# Patient Record
Sex: FEMALE | Race: WHITE | NOT HISPANIC OR LATINO
[De-identification: names, ages, dates, MRNs, and addresses within clinical notes are randomized per-mention and may not be internally consistent; named-entity substitution may affect disease eponyms.]

---

## 2017-10-24 ENCOUNTER — APPOINTMENT (OUTPATIENT)
Dept: OBGYN | Facility: CLINIC | Age: 82
End: 2017-10-24

## 2017-10-24 ENCOUNTER — APPOINTMENT (OUTPATIENT)
Dept: OBGYN | Facility: CLINIC | Age: 82
End: 2017-10-24
Payer: MEDICARE

## 2017-10-24 VITALS
DIASTOLIC BLOOD PRESSURE: 82 MMHG | SYSTOLIC BLOOD PRESSURE: 130 MMHG | HEART RATE: 82 BPM | BODY MASS INDEX: 23.21 KG/M2 | WEIGHT: 131 LBS | HEIGHT: 63 IN | RESPIRATION RATE: 16 BRPM | OXYGEN SATURATION: 96 %

## 2017-10-24 DIAGNOSIS — Z13.820 ENCOUNTER FOR SCREENING FOR OSTEOPOROSIS: ICD-10-CM

## 2017-10-24 PROCEDURE — 99397 PER PM REEVAL EST PAT 65+ YR: CPT

## 2017-10-25 LAB — HPV HIGH+LOW RISK DNA PNL CVX: NOT DETECTED

## 2017-10-27 LAB — CYTOLOGY CVX/VAG DOC THIN PREP: NORMAL

## 2018-08-09 ENCOUNTER — OTHER (OUTPATIENT)
Age: 83
End: 2018-08-09

## 2018-10-18 ENCOUNTER — CHART COPY (OUTPATIENT)
Age: 83
End: 2018-10-18

## 2018-10-31 ENCOUNTER — APPOINTMENT (OUTPATIENT)
Dept: OBGYN | Facility: CLINIC | Age: 83
End: 2018-10-31
Payer: MEDICARE

## 2018-10-31 VITALS
HEART RATE: 83 BPM | SYSTOLIC BLOOD PRESSURE: 120 MMHG | OXYGEN SATURATION: 96 % | DIASTOLIC BLOOD PRESSURE: 78 MMHG | WEIGHT: 132 LBS | BODY MASS INDEX: 23.39 KG/M2 | HEIGHT: 63 IN

## 2018-10-31 DIAGNOSIS — N95.9 UNSPECIFIED MENOPAUSAL AND PERIMENOPAUSAL DISORDER: ICD-10-CM

## 2018-10-31 PROCEDURE — 99214 OFFICE O/P EST MOD 30 MIN: CPT

## 2018-10-31 RX ORDER — DORZOLAMIDE HYDROCHLORIDE TIMOLOL MALEATE 20; 5 MG/ML; MG/ML
22.3-6.8 SOLUTION/ DROPS OPHTHALMIC
Qty: 10 | Refills: 0 | Status: ACTIVE | COMMUNITY
Start: 2018-01-29

## 2018-10-31 RX ORDER — SULFACETAMIDE SODIUM AND PREDNISOLONE SODIUM PHOSPHATE 100; 2.3 MG/ML; MG/ML
10-0.23 SOLUTION/ DROPS OPHTHALMIC
Qty: 30 | Refills: 0 | Status: ACTIVE | COMMUNITY
Start: 2018-06-15

## 2018-10-31 RX ORDER — AMOXICILLIN 500 MG/1
500 TABLET, FILM COATED ORAL
Qty: 20 | Refills: 0 | Status: DISCONTINUED | COMMUNITY
Start: 2018-09-04 | End: 2018-10-31

## 2018-10-31 RX ORDER — DESOXIMETASONE 2.5 MG/G
0.25 CREAM TOPICAL
Qty: 60 | Refills: 0 | Status: DISCONTINUED | COMMUNITY
Start: 2018-05-29 | End: 2018-10-31

## 2018-10-31 RX ORDER — FLUOCINONIDE 0.5 MG/G
0.05 CREAM TOPICAL
Qty: 60 | Refills: 0 | Status: ACTIVE | COMMUNITY
Start: 2018-08-02

## 2018-10-31 RX ORDER — CLINDAMYCIN PHOSPHATE 10 MG/ML
1 LOTION TOPICAL
Qty: 60 | Refills: 0 | Status: DISCONTINUED | COMMUNITY
Start: 2018-05-29 | End: 2018-10-31

## 2019-07-31 DIAGNOSIS — Z12.39 ENCOUNTER FOR OTHER SCREENING FOR MALIGNANT NEOPLASM OF BREAST: ICD-10-CM

## 2019-10-30 ENCOUNTER — APPOINTMENT (OUTPATIENT)
Dept: OBGYN | Facility: CLINIC | Age: 84
End: 2019-10-30

## 2019-10-31 ENCOUNTER — APPOINTMENT (OUTPATIENT)
Dept: OBGYN | Facility: CLINIC | Age: 84
End: 2019-10-31
Payer: MEDICARE

## 2019-10-31 VITALS
BODY MASS INDEX: 21.26 KG/M2 | SYSTOLIC BLOOD PRESSURE: 124 MMHG | HEIGHT: 63 IN | HEART RATE: 75 BPM | DIASTOLIC BLOOD PRESSURE: 80 MMHG | OXYGEN SATURATION: 96 % | WEIGHT: 120 LBS

## 2019-10-31 DIAGNOSIS — Z01.419 ENCOUNTER FOR GYNECOLOGICAL EXAMINATION (GENERAL) (ROUTINE) W/OUT ABNORMAL FINDINGS: ICD-10-CM

## 2019-10-31 DIAGNOSIS — Z63.4 DISAPPEARANCE AND DEATH OF FAMILY MEMBER: ICD-10-CM

## 2019-10-31 PROCEDURE — 99397 PER PM REEVAL EST PAT 65+ YR: CPT

## 2019-10-31 SDOH — SOCIAL STABILITY - SOCIAL INSECURITY: DISSAPEARANCE AND DEATH OF FAMILY MEMBER: Z63.4

## 2019-10-31 NOTE — PHYSICAL EXAM
[Awake] : awake [Alert] : alert [Soft] : soft [Oriented x3] : oriented to person, place, and time [Normal] : uterus [Atrophy] : atrophy [No Bleeding] : there was no active vaginal bleeding [Uterine Adnexae] : were not tender and not enlarged [Acute Distress] : no acute distress [Mass] : no breast mass [Nipple Discharge] : no nipple discharge [Axillary LAD] : no axillary lymphadenopathy [Tender] : non tender

## 2019-10-31 NOTE — CHIEF COMPLAINT
[Follow Up] : follow up GYN visit [FreeTextEntry1] : pt here for f/u visit menopausal disorder--refuses dexa bec will not take meds\par colon 2017,pap 2017,mammoo 2019. Husb of 62 yrs passed away 3/2019.Pt lives alone. son travels a lot

## 2019-11-03 LAB — HPV HIGH+LOW RISK DNA PNL CVX: NOT DETECTED

## 2019-11-07 LAB — CYTOLOGY CVX/VAG DOC THIN PREP: ABNORMAL

## 2020-07-22 DIAGNOSIS — N94.9 UNSPECIFIED CONDITION ASSOCIATED WITH FEMALE GENITAL ORGANS AND MENSTRUAL CYCLE: ICD-10-CM

## 2020-09-08 ENCOUNTER — ASOB RESULT (OUTPATIENT)
Age: 85
End: 2020-09-08

## 2020-09-08 ENCOUNTER — APPOINTMENT (OUTPATIENT)
Dept: OBGYN | Facility: CLINIC | Age: 85
End: 2020-09-08
Payer: MEDICARE

## 2020-09-08 PROCEDURE — 76830 TRANSVAGINAL US NON-OB: CPT

## 2023-12-14 ENCOUNTER — INPATIENT (INPATIENT)
Facility: HOSPITAL | Age: 88
LOS: 4 days | Discharge: NURSING, REHAB OR LONG-TERM CARE | DRG: 536 | End: 2023-12-19
Attending: INTERNAL MEDICINE | Admitting: INTERNAL MEDICINE
Payer: MEDICARE

## 2023-12-14 VITALS
HEART RATE: 100 BPM | WEIGHT: 110.01 LBS | TEMPERATURE: 98 F | DIASTOLIC BLOOD PRESSURE: 74 MMHG | HEIGHT: 63 IN | RESPIRATION RATE: 18 BRPM | SYSTOLIC BLOOD PRESSURE: 118 MMHG | OXYGEN SATURATION: 96 %

## 2023-12-14 DIAGNOSIS — S32.599A OTHER SPECIFIED FRACTURE OF UNSPECIFIED PUBIS, INITIAL ENCOUNTER FOR CLOSED FRACTURE: ICD-10-CM

## 2023-12-14 LAB
ACANTHOCYTES BLD QL SMEAR: SLIGHT — SIGNIFICANT CHANGE UP
ACANTHOCYTES BLD QL SMEAR: SLIGHT — SIGNIFICANT CHANGE UP
ALBUMIN SERPL ELPH-MCNC: 3.8 G/DL — SIGNIFICANT CHANGE UP (ref 3.3–5)
ALBUMIN SERPL ELPH-MCNC: 3.8 G/DL — SIGNIFICANT CHANGE UP (ref 3.3–5)
ALP SERPL-CCNC: 59 U/L — SIGNIFICANT CHANGE UP (ref 40–120)
ALP SERPL-CCNC: 59 U/L — SIGNIFICANT CHANGE UP (ref 40–120)
ALT FLD-CCNC: 53 U/L — HIGH (ref 10–45)
ALT FLD-CCNC: 53 U/L — HIGH (ref 10–45)
ANION GAP SERPL CALC-SCNC: 9 MMOL/L — SIGNIFICANT CHANGE UP (ref 5–17)
ANION GAP SERPL CALC-SCNC: 9 MMOL/L — SIGNIFICANT CHANGE UP (ref 5–17)
ANISOCYTOSIS BLD QL: SLIGHT — SIGNIFICANT CHANGE UP
ANISOCYTOSIS BLD QL: SLIGHT — SIGNIFICANT CHANGE UP
APTT BLD: 27.7 SEC — SIGNIFICANT CHANGE UP (ref 24.5–35.6)
APTT BLD: 27.7 SEC — SIGNIFICANT CHANGE UP (ref 24.5–35.6)
AST SERPL-CCNC: 32 U/L — SIGNIFICANT CHANGE UP (ref 10–40)
AST SERPL-CCNC: 32 U/L — SIGNIFICANT CHANGE UP (ref 10–40)
BASOPHILS # BLD AUTO: 0 K/UL — SIGNIFICANT CHANGE UP (ref 0–0.2)
BASOPHILS # BLD AUTO: 0 K/UL — SIGNIFICANT CHANGE UP (ref 0–0.2)
BASOPHILS NFR BLD AUTO: 0 % — SIGNIFICANT CHANGE UP (ref 0–2)
BASOPHILS NFR BLD AUTO: 0 % — SIGNIFICANT CHANGE UP (ref 0–2)
BILIRUB SERPL-MCNC: 0.7 MG/DL — SIGNIFICANT CHANGE UP (ref 0.2–1.2)
BILIRUB SERPL-MCNC: 0.7 MG/DL — SIGNIFICANT CHANGE UP (ref 0.2–1.2)
BUN SERPL-MCNC: 28 MG/DL — HIGH (ref 7–23)
BUN SERPL-MCNC: 28 MG/DL — HIGH (ref 7–23)
CALCIUM SERPL-MCNC: 9.3 MG/DL — SIGNIFICANT CHANGE UP (ref 8.4–10.5)
CALCIUM SERPL-MCNC: 9.3 MG/DL — SIGNIFICANT CHANGE UP (ref 8.4–10.5)
CHLORIDE SERPL-SCNC: 102 MMOL/L — SIGNIFICANT CHANGE UP (ref 96–108)
CHLORIDE SERPL-SCNC: 102 MMOL/L — SIGNIFICANT CHANGE UP (ref 96–108)
CO2 SERPL-SCNC: 27 MMOL/L — SIGNIFICANT CHANGE UP (ref 22–31)
CO2 SERPL-SCNC: 27 MMOL/L — SIGNIFICANT CHANGE UP (ref 22–31)
CREAT SERPL-MCNC: 0.66 MG/DL — SIGNIFICANT CHANGE UP (ref 0.5–1.3)
CREAT SERPL-MCNC: 0.66 MG/DL — SIGNIFICANT CHANGE UP (ref 0.5–1.3)
DACRYOCYTES BLD QL SMEAR: SLIGHT — SIGNIFICANT CHANGE UP
DACRYOCYTES BLD QL SMEAR: SLIGHT — SIGNIFICANT CHANGE UP
EGFR: 84 ML/MIN/1.73M2 — SIGNIFICANT CHANGE UP
EGFR: 84 ML/MIN/1.73M2 — SIGNIFICANT CHANGE UP
EOSINOPHIL # BLD AUTO: 0 K/UL — SIGNIFICANT CHANGE UP (ref 0–0.5)
EOSINOPHIL # BLD AUTO: 0 K/UL — SIGNIFICANT CHANGE UP (ref 0–0.5)
EOSINOPHIL NFR BLD AUTO: 0 % — SIGNIFICANT CHANGE UP (ref 0–6)
EOSINOPHIL NFR BLD AUTO: 0 % — SIGNIFICANT CHANGE UP (ref 0–6)
GLUCOSE SERPL-MCNC: 81 MG/DL — SIGNIFICANT CHANGE UP (ref 70–99)
GLUCOSE SERPL-MCNC: 81 MG/DL — SIGNIFICANT CHANGE UP (ref 70–99)
HCT VFR BLD CALC: 34.2 % — LOW (ref 34.5–45)
HCT VFR BLD CALC: 34.2 % — LOW (ref 34.5–45)
HGB BLD-MCNC: 11.1 G/DL — LOW (ref 11.5–15.5)
HGB BLD-MCNC: 11.1 G/DL — LOW (ref 11.5–15.5)
INR BLD: 1.3 RATIO — HIGH (ref 0.85–1.18)
INR BLD: 1.3 RATIO — HIGH (ref 0.85–1.18)
LYMPHOCYTES # BLD AUTO: 0.87 K/UL — LOW (ref 1–3.3)
LYMPHOCYTES # BLD AUTO: 0.87 K/UL — LOW (ref 1–3.3)
LYMPHOCYTES # BLD AUTO: 10.5 % — LOW (ref 13–44)
LYMPHOCYTES # BLD AUTO: 10.5 % — LOW (ref 13–44)
MACROCYTES BLD QL: SLIGHT — SIGNIFICANT CHANGE UP
MACROCYTES BLD QL: SLIGHT — SIGNIFICANT CHANGE UP
MANUAL SMEAR VERIFICATION: SIGNIFICANT CHANGE UP
MANUAL SMEAR VERIFICATION: SIGNIFICANT CHANGE UP
MCHC RBC-ENTMCNC: 30.2 PG — SIGNIFICANT CHANGE UP (ref 27–34)
MCHC RBC-ENTMCNC: 30.2 PG — SIGNIFICANT CHANGE UP (ref 27–34)
MCHC RBC-ENTMCNC: 32.5 GM/DL — SIGNIFICANT CHANGE UP (ref 32–36)
MCHC RBC-ENTMCNC: 32.5 GM/DL — SIGNIFICANT CHANGE UP (ref 32–36)
MCV RBC AUTO: 92.9 FL — SIGNIFICANT CHANGE UP (ref 80–100)
MCV RBC AUTO: 92.9 FL — SIGNIFICANT CHANGE UP (ref 80–100)
MONOCYTES # BLD AUTO: 0.15 K/UL — SIGNIFICANT CHANGE UP (ref 0–0.9)
MONOCYTES # BLD AUTO: 0.15 K/UL — SIGNIFICANT CHANGE UP (ref 0–0.9)
MONOCYTES NFR BLD AUTO: 1.8 % — LOW (ref 2–14)
MONOCYTES NFR BLD AUTO: 1.8 % — LOW (ref 2–14)
NEUTROPHILS # BLD AUTO: 7.24 K/UL — SIGNIFICANT CHANGE UP (ref 1.8–7.4)
NEUTROPHILS # BLD AUTO: 7.24 K/UL — SIGNIFICANT CHANGE UP (ref 1.8–7.4)
NEUTROPHILS NFR BLD AUTO: 87.7 % — HIGH (ref 43–77)
NEUTROPHILS NFR BLD AUTO: 87.7 % — HIGH (ref 43–77)
OVALOCYTES BLD QL SMEAR: SLIGHT — SIGNIFICANT CHANGE UP
OVALOCYTES BLD QL SMEAR: SLIGHT — SIGNIFICANT CHANGE UP
PLAT MORPH BLD: NORMAL — SIGNIFICANT CHANGE UP
PLAT MORPH BLD: NORMAL — SIGNIFICANT CHANGE UP
PLATELET # BLD AUTO: 113 K/UL — LOW (ref 150–400)
PLATELET # BLD AUTO: 113 K/UL — LOW (ref 150–400)
POIKILOCYTOSIS BLD QL AUTO: SLIGHT — SIGNIFICANT CHANGE UP
POIKILOCYTOSIS BLD QL AUTO: SLIGHT — SIGNIFICANT CHANGE UP
POLYCHROMASIA BLD QL SMEAR: SLIGHT — SIGNIFICANT CHANGE UP
POLYCHROMASIA BLD QL SMEAR: SLIGHT — SIGNIFICANT CHANGE UP
POTASSIUM SERPL-MCNC: 3.7 MMOL/L — SIGNIFICANT CHANGE UP (ref 3.5–5.3)
POTASSIUM SERPL-MCNC: 3.7 MMOL/L — SIGNIFICANT CHANGE UP (ref 3.5–5.3)
POTASSIUM SERPL-SCNC: 3.7 MMOL/L — SIGNIFICANT CHANGE UP (ref 3.5–5.3)
POTASSIUM SERPL-SCNC: 3.7 MMOL/L — SIGNIFICANT CHANGE UP (ref 3.5–5.3)
PROT SERPL-MCNC: 6.2 G/DL — SIGNIFICANT CHANGE UP (ref 6–8.3)
PROT SERPL-MCNC: 6.2 G/DL — SIGNIFICANT CHANGE UP (ref 6–8.3)
PROTHROM AB SERPL-ACNC: 13.5 SEC — HIGH (ref 9.5–13)
PROTHROM AB SERPL-ACNC: 13.5 SEC — HIGH (ref 9.5–13)
RBC # BLD: 3.68 M/UL — LOW (ref 3.8–5.2)
RBC # BLD: 3.68 M/UL — LOW (ref 3.8–5.2)
RBC # FLD: 15.3 % — HIGH (ref 10.3–14.5)
RBC # FLD: 15.3 % — HIGH (ref 10.3–14.5)
RBC BLD AUTO: ABNORMAL
RBC BLD AUTO: ABNORMAL
SCHISTOCYTES BLD QL AUTO: SLIGHT — SIGNIFICANT CHANGE UP
SCHISTOCYTES BLD QL AUTO: SLIGHT — SIGNIFICANT CHANGE UP
SODIUM SERPL-SCNC: 138 MMOL/L — SIGNIFICANT CHANGE UP (ref 135–145)
SODIUM SERPL-SCNC: 138 MMOL/L — SIGNIFICANT CHANGE UP (ref 135–145)
WBC # BLD: 8.25 K/UL — SIGNIFICANT CHANGE UP (ref 3.8–10.5)
WBC # BLD: 8.25 K/UL — SIGNIFICANT CHANGE UP (ref 3.8–10.5)
WBC # FLD AUTO: 8.25 K/UL — SIGNIFICANT CHANGE UP (ref 3.8–10.5)
WBC # FLD AUTO: 8.25 K/UL — SIGNIFICANT CHANGE UP (ref 3.8–10.5)

## 2023-12-14 PROCEDURE — 72170 X-RAY EXAM OF PELVIS: CPT | Mod: 26,XS

## 2023-12-14 PROCEDURE — 99285 EMERGENCY DEPT VISIT HI MDM: CPT

## 2023-12-14 PROCEDURE — 73552 X-RAY EXAM OF FEMUR 2/>: CPT | Mod: 26,LT

## 2023-12-14 PROCEDURE — 73502 X-RAY EXAM HIP UNI 2-3 VIEWS: CPT | Mod: 26,LT

## 2023-12-14 PROCEDURE — 70450 CT HEAD/BRAIN W/O DYE: CPT | Mod: 26,MA

## 2023-12-14 PROCEDURE — 76376 3D RENDER W/INTRP POSTPROCES: CPT | Mod: 26

## 2023-12-14 PROCEDURE — 72192 CT PELVIS W/O DYE: CPT | Mod: 26,MA

## 2023-12-14 RX ORDER — DORZOLAMIDE HYDROCHLORIDE TIMOLOL MALEATE 20; 5 MG/ML; MG/ML
1 SOLUTION/ DROPS OPHTHALMIC
Refills: 0 | Status: DISCONTINUED | OUTPATIENT
Start: 2023-12-14 | End: 2023-12-19

## 2023-12-14 RX ORDER — TERAZOSIN HYDROCHLORIDE 10 MG/1
1 CAPSULE ORAL
Refills: 0 | DISCHARGE

## 2023-12-14 RX ORDER — METOPROLOL TARTRATE 50 MG
100 TABLET ORAL DAILY
Refills: 0 | Status: DISCONTINUED | OUTPATIENT
Start: 2023-12-14 | End: 2023-12-17

## 2023-12-14 RX ORDER — ATORVASTATIN CALCIUM 80 MG/1
10 TABLET, FILM COATED ORAL AT BEDTIME
Refills: 0 | Status: DISCONTINUED | OUTPATIENT
Start: 2023-12-14 | End: 2023-12-19

## 2023-12-14 RX ORDER — PANTOPRAZOLE SODIUM 20 MG/1
40 TABLET, DELAYED RELEASE ORAL
Refills: 0 | Status: DISCONTINUED | OUTPATIENT
Start: 2023-12-14 | End: 2023-12-19

## 2023-12-14 RX ORDER — ATORVASTATIN CALCIUM 80 MG/1
1 TABLET, FILM COATED ORAL
Refills: 0 | DISCHARGE

## 2023-12-14 RX ORDER — HEPARIN SODIUM 5000 [USP'U]/ML
5000 INJECTION INTRAVENOUS; SUBCUTANEOUS EVERY 12 HOURS
Refills: 0 | Status: DISCONTINUED | OUTPATIENT
Start: 2023-12-14 | End: 2023-12-19

## 2023-12-14 RX ORDER — LATANOPROST 0.05 MG/ML
1 SOLUTION/ DROPS OPHTHALMIC; TOPICAL AT BEDTIME
Refills: 0 | Status: DISCONTINUED | OUTPATIENT
Start: 2023-12-14 | End: 2023-12-19

## 2023-12-14 RX ORDER — DOXAZOSIN MESYLATE 4 MG
4 TABLET ORAL AT BEDTIME
Refills: 0 | Status: DISCONTINUED | OUTPATIENT
Start: 2023-12-14 | End: 2023-12-19

## 2023-12-14 RX ORDER — ACETAMINOPHEN 500 MG
975 TABLET ORAL ONCE
Refills: 0 | Status: COMPLETED | OUTPATIENT
Start: 2023-12-14 | End: 2023-12-14

## 2023-12-14 RX ORDER — ESOMEPRAZOLE MAGNESIUM 40 MG/1
1 CAPSULE, DELAYED RELEASE ORAL
Refills: 0 | DISCHARGE

## 2023-12-14 RX ADMIN — Medication 975 MILLIGRAM(S): at 21:34

## 2023-12-14 RX ADMIN — Medication 975 MILLIGRAM(S): at 14:37

## 2023-12-14 NOTE — H&P ADULT - NSHPPHYSICALEXAM_GEN_ALL_CORE
Vital Signs Last 24 Hrs  T(C): 36.6 (14 Dec 2023 22:04), Max: 36.8 (14 Dec 2023 13:44)  T(F): 97.9 (14 Dec 2023 22:04), Max: 98.3 (14 Dec 2023 13:44)  HR: 110 (14 Dec 2023 22:04) (100 - 114)  BP: 111/75 (14 Dec 2023 22:04) (111/75 - 128/73)  BP(mean): 92 (14 Dec 2023 17:44) (92 - 92)  RR: 18 (14 Dec 2023 22:04) (16 - 18)  SpO2: 92% (14 Dec 2023 22:04) (92% - 96%)    Parameters below as of 14 Dec 2023 22:04  Patient On (Oxygen Delivery Method): room air Vital Signs Last 24 Hrs  T(C): 36.6 (14 Dec 2023 22:04), Max: 36.8 (14 Dec 2023 13:44)  T(F): 97.9 (14 Dec 2023 22:04), Max: 98.3 (14 Dec 2023 13:44)  HR: 110 (14 Dec 2023 22:04) (100 - 114)  BP: 111/75 (14 Dec 2023 22:04) (111/75 - 128/73)  BP(mean): 92 (14 Dec 2023 17:44) (92 - 92)  RR: 18 (14 Dec 2023 22:04) (16 - 18)  SpO2: 92% (14 Dec 2023 22:04) (92% - 96%)    Parameters below as of 14 Dec 2023 22:04  Patient On (Oxygen Delivery Method): room air    heent : nc/at   neck : supple, no JVD  lungs : B/L fair A/E , no w/r/r  Heart: s1s2 nml  abd : soft, NABS, NT/ND  ext : no e/c/c, pulses 2 +  neuro: aaox3

## 2023-12-14 NOTE — ED ADULT NURSE NOTE - OBJECTIVE STATEMENT
Pt is a 87 yo female A&OX4 PMH HTN, HLD presenting to the ED by EMS from home complaining of a fall. Pt states she fell yesterday while walking with her cane, states she "lost her balance," and fell on carpet. Pt denies SOB, CP, dizziness prior to the fall. Pt endorsing L hip pain, unable to walk and place pressure to the L side. Pt states she hit the back of her head, denies LOC, denies blood thinner use. Pt states she was able to get herself up to a sitting position following the son but is unable to put pressure on L side. Pts son has been carrying the pt to get around, ambulates with a cane at baseline. Denies fever/chills, SOB, chest pain, abd pain, n/v/diarrhea, urinary symptoms, weakness, dizziness. 20G placed in LAC. Call bell within reach, side rails up, bed in lowest position.

## 2023-12-14 NOTE — H&P ADULT - NSHPLABSRESULTS_GEN_ALL_CORE
11.1   8.25  )-----------( 113      ( 14 Dec 2023 14:57 )             34.2   12-14    138  |  102  |  28<H>  ----------------------------<  81  3.7   |  27  |  0.66    Ca    9.3      14 Dec 2023 14:57    TPro  6.2  /  Alb  3.8  /  TBili  0.7  /  DBili  x   /  AST  32  /  ALT  53<H>  /  AlkPhos  59  12-14

## 2023-12-14 NOTE — ED PROVIDER NOTE - PHYSICAL EXAMINATION
yes
GEN: Pt elderly, non-toxic in NAD, alert.  PSYCH: Affect and mood appropriate.  EYES: Sclera white w/o injection, EOMI, PERRLA.   ENT: Neck supple. Airway patent.  RESP: CTA b/l, no wheezes, rales, or rhonchi.   CARDIAC: RRR, clear distinct S1, S2.  ABD: Soft, non-tender. No CVAT b/l.  MSK: No obvious joint deformity. Mild ttp anterior L hip. Neg log roll. Unable to actively range. +Passive ROM. NV intact distally. No midline spinal ttp.  NEURO: Nonfocal.  VASC: Strong distal pulses. Trace LE pitting edema. No calf tenderness.  SKIN: No rashes or lesions.

## 2023-12-14 NOTE — ED PROVIDER NOTE - PROGRESS NOTE DETAILS
Freddie Weems PA-C: Imaging shows left superior and inferior pubic rami fractures.  Patient seen by Ortho, fracture is nonoperative.  CT head negative.  Pt endorsed to Dr. Michele Gray for admission for PT, likely dc to [facility]. Freddie Weems PA-C: Imaging shows left superior and inferior pubic rami fractures.  Patient seen by Ortho, fracture is nonoperative.  CT head negative.  Pt endorsed to Dr. Michele Gray for admission for PT, likely dc to [facility]. Freddie Weems PA-C: Imaging shows left superior and inferior pubic rami fractures.  Patient seen by Ortho, fracture is nonoperative.  CT head negative.  Pt endorsed to Dr. March for admission for PT, likely dc to [facility]. Freddie Weems PA-C: Imaging shows left superior and inferior pubic rami fractures.  Patient seen by Ortho, fracture is nonoperative.  CT head negative.  Pt endorsed to Dr. March for admission for PT, likely dc to [facility].

## 2023-12-14 NOTE — CONSULT NOTE ADULT - ASSESSMENT
ASSESSMENT & PLAN  88yFemale w/ L sup/inf pubic rami fx.  -WBAT LLE  -pain control  -ice/cold compress  -PT/OT  -no acute ortho surgery at this time  -f/u outpt with Dr. Agosto in 1 week, call office for appt    For all questions related to patient care, please reach out to the on-call team via the pager.     Geeta Rasheed, PGY 2  Orthopaedic Surgery  [facility] [phone]  [facility] [phone]  [facility] [phone]   ASSESSMENT & PLAN  88yFemale w/ L sup/inf pubic rami fx.  -WBAT LLE  -pain control  -ice/cold compress  -PT/OT  -no acute ortho surgery at this time  -f/u outpt with Dr. Agosto in 1 week, call office for appt    For all questions related to patient care, please reach out to the on-call team via the pager.     Geeta Rasheed, PGY 2  Orthopaedic Surgery  [facility] [phone]  [facility] [phone]  [facility] [phone]

## 2023-12-14 NOTE — ED PROVIDER NOTE - NSICDXPASTMEDICALHX_GEN_ALL_CORE_FT
PAST MEDICAL HISTORY:  Arrhythmia     GERD (Gastroesophageal Reflux Disease)     Glaucoma     Hip Fx     HTN (Hypertension)     Hyperlipidemia     Polyp of Corpus Uteri

## 2023-12-14 NOTE — CONSULT NOTE ADULT - SUBJECTIVE AND OBJECTIVE BOX
HPI  88yFemale w/ PMH of HTN, HLD, glaucoma c/o L groin pain s/p mechanical fall yesterday. Unable to bear weight in the LLE since the fall. Denies headstrike or LOC. Denies numbness/tingling in the LLE. Denies any other trauma/injuries at this time. At baseline, community ambulator w cane.    ROS  Negative unless otherwise specified in HPI.    PAST MEDICAL & SURGICAL Hx  PAST MEDICAL & SURGICAL HISTORY:  HTN (Hypertension)      Hyperlipidemia      Glaucoma      Arrhythmia      Hip Fx      Polyp of Corpus Uteri      GERD (Gastroesophageal Reflux Disease)       Delivery x 2      Hip Fx-ORIF Right Hip-      Breast Cyst removal -       History of Tonsillectomy      cholecystectomy 2011          MEDICATIONS  Home Medications:      ALLERGIES  No Known Allergies      FAMILY Hx  FAMILY HISTORY:      SOCIAL Hx  Social History:      VITALS  Vital Signs Last 24 Hrs  T(C): 36.7 (14 Dec 2023 14:47), Max: 36.8 (14 Dec 2023 13:44)  T(F): 98 (14 Dec 2023 14:47), Max: 98.3 (14 Dec 2023 13:44)  HR: 109 (14 Dec 2023 14:47) (100 - 109)  BP: 125/76 (14 Dec 2023 14:47) (118/74 - 125/76)  BP(mean): 92 (14 Dec 2023 14:47) (92 - 92)  RR: 16 (14 Dec 2023 14:47) (16 - 18)  SpO2: 95% (14 Dec 2023 14:47) (95% - 96%)    Parameters below as of 14 Dec 2023 14:47  Patient On (Oxygen Delivery Method): room air        PHYSICAL EXAM  Gen: Lying in bed, NAD  Resp: No increased WOB  LLE:  Skin intact  +TTP over L groin, no TTP along remainder of extremity; compartments soft  Limited ROM of hip 2/2 pain, passive ROM with only mild pain  (-) Log roll test  No pain with axial loading  Motor: TA/EHL/GS/FHL intact  Sensory: DP/SP/Tib/[place]/Saph SILT  +DP pulse, WWP    LABS                        11.1   8.25  )-----------( 113      ( 14 Dec 2023 14:57 )             34.2     12-14    138  |  102  |  28<H>  ----------------------------<  81  3.7   |  27  |  0.66    Ca    9.3      14 Dec 2023 14:57    TPro  6.2  /  Alb  3.8  /  TBili  0.7  /  DBili  x   /  AST  32  /  ALT  53<H>  /  AlkPhos  59  12-14    PT/INR - ( 14 Dec 2023 14:57 )   PT: 13.5 sec;   INR: 1.30 ratio         PTT - ( 14 Dec 2023 14:57 )  PTT:27.7 sec    IMAGING  XRs: L sup/inf pubic rami fx

## 2023-12-14 NOTE — ED PROVIDER NOTE - NSICDXPASTSURGICALHX_GEN_ALL_CORE_FT
PAST SURGICAL HISTORY:  Breast Cyst removal -       Delivery x 2     cholecystectomy 2011     Hip Fx-ORIF Right Hip-     History of Tonsillectomy

## 2023-12-14 NOTE — ED ADULT NURSE NOTE - NSICDXPASTSURGICALHX_GEN_ALL_CORE_FT
Care Management Update Received call from Briana Roque, [facility], she was inquiring about orders for outpatient Physical Therapy. Discussed with nurse that patient in OBS status, no new orders should be required for resumption of outpatient physical therapy. Chandrika Stokes RN, BSN, [facility] 
ED Care Management [phone] PAST SURGICAL HISTORY:  Breast Cyst removal -       Delivery x 2     cholecystectomy 2011     Hip Fx-ORIF Right Hip-     History of Tonsillectomy

## 2023-12-14 NOTE — H&P ADULT - ASSESSMENT
A/P     S/P mechanical fall / left sup/ inf pubic rami fracture   -seen by ortho in ED   conservative Rx   pain meds   PT eval   may need STR on d/c     HTN :   c/w toprol     Glaucoma :  c/w eye drops     dispo: PT eval , may need STR on d/c

## 2023-12-14 NOTE — ED ADULT NURSE NOTE - NSFALLHARMRISKINTERV_ED_ALL_ED
Assistance OOB with selected safe patient handling equipment if applicable/Communicate risk of Fall with Harm to all staff, patient, and family/Monitor gait and stability/Provide patient with walking aids/Provide visual cue: red socks, yellow wristband, yellow gown, etc/Reinforce activity limits and safety measures with patient and family/Bed in lowest position, wheels locked, appropriate side rails in place/Call bell, personal items and telephone in reach/Instruct patient to call for assistance before getting out of bed/chair/stretcher/Non-slip footwear applied when patient is off stretcher/[place] to call system/Physically safe environment - no spills, clutter or unnecessary equipment/Purposeful Proactive Rounding/Room/bathroom lighting operational, light cord in reach Assistance OOB with selected safe patient handling equipment if applicable/Communicate risk of Fall with Harm to all staff, patient, and family/Monitor gait and stability/Provide patient with walking aids/Provide visual cue: red socks, yellow wristband, yellow gown, etc/Reinforce activity limits and safety measures with patient and family/Bed in lowest position, wheels locked, appropriate side rails in place/Call bell, personal items and telephone in reach/Instruct patient to call for assistance before getting out of bed/chair/stretcher/Non-slip footwear applied when patient is off stretcher/[place] to call system/Physically safe environment - no spills, clutter or unnecessary equipment/Purposeful Proactive Rounding/Room/bathroom lighting operational, light cord in reach

## 2023-12-14 NOTE — ED PROVIDER NOTE - CLINICAL SUMMARY MEDICAL DECISION MAKING FREE TEXT BOX
Dr. Hickey (Attending Physician)  88-year-old female past medical history hypertension, hyperlipidemia, glaucoma, not on AC, presents to ED complaining of left hip and groin pain with inability to fully bear weight and difficulty ambulating status post unwitnessed mechanical trip and fall from standing yesterday afternoon.  Recalls event, fell while walking with a cane.  Landed on buttocks and then hit back of head on floor.  No LOC.  Was able to get under her own power.  Denies dizziness, vision changes, neck pain, chest pain, shortness of breath, abdominal pain, nausea, vomiting, urinary complaints.    Will check basic labs, xray hip/pelvis and reassess. ddx included hip, pelvic fractures. low suspicion for ICH with described mechanism of fall that she remembers.

## 2023-12-14 NOTE — ED PROVIDER NOTE - OBJECTIVE STATEMENT
88-year-old female past medical history hypertension, hyperlipidemia, glaucoma, not on AC, presents to ED complaining of left hip and groin pain with inability to fully bear weight and difficulty ambulating status post unwitnessed mechanical trip and fall from standing yesterday afternoon.  Recalls event, fell while walking with a cane.  Landed on buttocks and then hit back of head on floor.  No LOC.  Was able to get under her own power.  Denies dizziness, vision changes, neck pain, chest pain, shortness of breath, abdominal pain, nausea, vomiting, urinary complaints.

## 2023-12-15 LAB
ALBUMIN SERPL ELPH-MCNC: 3.2 G/DL — LOW (ref 3.3–5)
ALBUMIN SERPL ELPH-MCNC: 3.2 G/DL — LOW (ref 3.3–5)
ALP SERPL-CCNC: 53 U/L — SIGNIFICANT CHANGE UP (ref 40–120)
ALP SERPL-CCNC: 53 U/L — SIGNIFICANT CHANGE UP (ref 40–120)
ALT FLD-CCNC: 40 U/L — SIGNIFICANT CHANGE UP (ref 10–45)
ALT FLD-CCNC: 40 U/L — SIGNIFICANT CHANGE UP (ref 10–45)
ANION GAP SERPL CALC-SCNC: 8 MMOL/L — SIGNIFICANT CHANGE UP (ref 5–17)
ANION GAP SERPL CALC-SCNC: 8 MMOL/L — SIGNIFICANT CHANGE UP (ref 5–17)
AST SERPL-CCNC: 22 U/L — SIGNIFICANT CHANGE UP (ref 10–40)
AST SERPL-CCNC: 22 U/L — SIGNIFICANT CHANGE UP (ref 10–40)
BILIRUB SERPL-MCNC: 0.5 MG/DL — SIGNIFICANT CHANGE UP (ref 0.2–1.2)
BILIRUB SERPL-MCNC: 0.5 MG/DL — SIGNIFICANT CHANGE UP (ref 0.2–1.2)
BUN SERPL-MCNC: 26 MG/DL — HIGH (ref 7–23)
BUN SERPL-MCNC: 26 MG/DL — HIGH (ref 7–23)
CALCIUM SERPL-MCNC: 9 MG/DL — SIGNIFICANT CHANGE UP (ref 8.4–10.5)
CALCIUM SERPL-MCNC: 9 MG/DL — SIGNIFICANT CHANGE UP (ref 8.4–10.5)
CHLORIDE SERPL-SCNC: 104 MMOL/L — SIGNIFICANT CHANGE UP (ref 96–108)
CHLORIDE SERPL-SCNC: 104 MMOL/L — SIGNIFICANT CHANGE UP (ref 96–108)
CO2 SERPL-SCNC: 29 MMOL/L — SIGNIFICANT CHANGE UP (ref 22–31)
CO2 SERPL-SCNC: 29 MMOL/L — SIGNIFICANT CHANGE UP (ref 22–31)
CREAT SERPL-MCNC: 0.68 MG/DL — SIGNIFICANT CHANGE UP (ref 0.5–1.3)
CREAT SERPL-MCNC: 0.68 MG/DL — SIGNIFICANT CHANGE UP (ref 0.5–1.3)
EGFR: 84 ML/MIN/1.73M2 — SIGNIFICANT CHANGE UP
EGFR: 84 ML/MIN/1.73M2 — SIGNIFICANT CHANGE UP
GLUCOSE SERPL-MCNC: 80 MG/DL — SIGNIFICANT CHANGE UP (ref 70–99)
GLUCOSE SERPL-MCNC: 80 MG/DL — SIGNIFICANT CHANGE UP (ref 70–99)
HCT VFR BLD CALC: 31.3 % — LOW (ref 34.5–45)
HCT VFR BLD CALC: 31.3 % — LOW (ref 34.5–45)
HGB BLD-MCNC: 10.2 G/DL — LOW (ref 11.5–15.5)
HGB BLD-MCNC: 10.2 G/DL — LOW (ref 11.5–15.5)
MCHC RBC-ENTMCNC: 30.5 PG — SIGNIFICANT CHANGE UP (ref 27–34)
MCHC RBC-ENTMCNC: 30.5 PG — SIGNIFICANT CHANGE UP (ref 27–34)
MCHC RBC-ENTMCNC: 32.6 GM/DL — SIGNIFICANT CHANGE UP (ref 32–36)
MCHC RBC-ENTMCNC: 32.6 GM/DL — SIGNIFICANT CHANGE UP (ref 32–36)
MCV RBC AUTO: 93.7 FL — SIGNIFICANT CHANGE UP (ref 80–100)
MCV RBC AUTO: 93.7 FL — SIGNIFICANT CHANGE UP (ref 80–100)
NRBC # BLD: 0 /100 WBCS — SIGNIFICANT CHANGE UP (ref 0–0)
NRBC # BLD: 0 /100 WBCS — SIGNIFICANT CHANGE UP (ref 0–0)
PLATELET # BLD AUTO: 98 K/UL — LOW (ref 150–400)
PLATELET # BLD AUTO: 98 K/UL — LOW (ref 150–400)
POTASSIUM SERPL-MCNC: 4.2 MMOL/L — SIGNIFICANT CHANGE UP (ref 3.5–5.3)
POTASSIUM SERPL-MCNC: 4.2 MMOL/L — SIGNIFICANT CHANGE UP (ref 3.5–5.3)
POTASSIUM SERPL-SCNC: 4.2 MMOL/L — SIGNIFICANT CHANGE UP (ref 3.5–5.3)
POTASSIUM SERPL-SCNC: 4.2 MMOL/L — SIGNIFICANT CHANGE UP (ref 3.5–5.3)
PROT SERPL-MCNC: 5.5 G/DL — LOW (ref 6–8.3)
PROT SERPL-MCNC: 5.5 G/DL — LOW (ref 6–8.3)
RBC # BLD: 3.34 M/UL — LOW (ref 3.8–5.2)
RBC # BLD: 3.34 M/UL — LOW (ref 3.8–5.2)
RBC # FLD: 15.8 % — HIGH (ref 10.3–14.5)
RBC # FLD: 15.8 % — HIGH (ref 10.3–14.5)
SODIUM SERPL-SCNC: 141 MMOL/L — SIGNIFICANT CHANGE UP (ref 135–145)
SODIUM SERPL-SCNC: 141 MMOL/L — SIGNIFICANT CHANGE UP (ref 135–145)
TSH SERPL-MCNC: 1.75 UIU/ML — SIGNIFICANT CHANGE UP (ref 0.27–4.2)
TSH SERPL-MCNC: 1.75 UIU/ML — SIGNIFICANT CHANGE UP (ref 0.27–4.2)
WBC # BLD: 7.29 K/UL — SIGNIFICANT CHANGE UP (ref 3.8–10.5)
WBC # BLD: 7.29 K/UL — SIGNIFICANT CHANGE UP (ref 3.8–10.5)
WBC # FLD AUTO: 7.29 K/UL — SIGNIFICANT CHANGE UP (ref 3.8–10.5)
WBC # FLD AUTO: 7.29 K/UL — SIGNIFICANT CHANGE UP (ref 3.8–10.5)

## 2023-12-15 PROCEDURE — 93010 ELECTROCARDIOGRAM REPORT: CPT

## 2023-12-15 RX ORDER — OXYCODONE HYDROCHLORIDE 5 MG/1
2.5 TABLET ORAL
Refills: 0 | Status: DISCONTINUED | OUTPATIENT
Start: 2023-12-15 | End: 2023-12-19

## 2023-12-15 RX ORDER — METOPROLOL TARTRATE 50 MG
25 TABLET ORAL ONCE
Refills: 0 | Status: COMPLETED | OUTPATIENT
Start: 2023-12-15 | End: 2023-12-15

## 2023-12-15 RX ORDER — ACETAMINOPHEN 500 MG
650 TABLET ORAL EVERY 6 HOURS
Refills: 0 | Status: DISCONTINUED | OUTPATIENT
Start: 2023-12-15 | End: 2023-12-19

## 2023-12-15 RX ORDER — POLYETHYLENE GLYCOL 3350 17 G/17G
17 POWDER, FOR SOLUTION ORAL DAILY
Refills: 0 | Status: DISCONTINUED | OUTPATIENT
Start: 2023-12-15 | End: 2023-12-19

## 2023-12-15 RX ADMIN — DORZOLAMIDE HYDROCHLORIDE TIMOLOL MALEATE 1 DROP(S): 20; 5 SOLUTION/ DROPS OPHTHALMIC at 05:21

## 2023-12-15 RX ADMIN — HEPARIN SODIUM 5000 UNIT(S): 5000 INJECTION INTRAVENOUS; SUBCUTANEOUS at 17:12

## 2023-12-15 RX ADMIN — ATORVASTATIN CALCIUM 10 MILLIGRAM(S): 80 TABLET, FILM COATED ORAL at 21:40

## 2023-12-15 RX ADMIN — Medication 100 MILLIGRAM(S): at 05:21

## 2023-12-15 RX ADMIN — PANTOPRAZOLE SODIUM 40 MILLIGRAM(S): 20 TABLET, DELAYED RELEASE ORAL at 05:27

## 2023-12-15 RX ADMIN — DORZOLAMIDE HYDROCHLORIDE TIMOLOL MALEATE 1 DROP(S): 20; 5 SOLUTION/ DROPS OPHTHALMIC at 17:12

## 2023-12-15 RX ADMIN — Medication 4 MILLIGRAM(S): at 21:41

## 2023-12-15 RX ADMIN — LATANOPROST 1 DROP(S): 0.05 SOLUTION/ DROPS OPHTHALMIC; TOPICAL at 21:41

## 2023-12-15 RX ADMIN — HEPARIN SODIUM 5000 UNIT(S): 5000 INJECTION INTRAVENOUS; SUBCUTANEOUS at 05:21

## 2023-12-15 RX ADMIN — Medication 25 MILLIGRAM(S): at 21:40

## 2023-12-15 NOTE — PHYSICAL THERAPY INITIAL EVALUATION ADULT - PERTINENT HX OF CURRENT PROBLEM, REHAB EVAL
88-year-old female past medical history hypertension, hyperlipidemia, glaucoma, not on AC, presents to ED complaining of left hip and groin pain with inability to fully bear weight and difficulty ambulating status post unwitnessed mechanical trip and fall from standing.  12/14/23: Head CT (-) acute changes   CT pelvis: Acute, mildly displaced fracture of the left superior pubic ramus/pubic tubercle and inferior pubic ramus.

## 2023-12-15 NOTE — PROVIDER CONTACT NOTE (OTHER) - SITUATION
Pt tachycardic, HR bouncing around between 110-120s. /87(99), SPO2 96% on 1L NC, RR 17, T 36.9 C. Pt A&OX4, confused at times. Pt denies chest pain/shortness of breath/diff breathing.

## 2023-12-15 NOTE — PROVIDER CONTACT NOTE (OTHER) - ASSESSMENT
Pt tachycardic, HR bouncing around between 110-120s. Pt Hx of afib. /87(99), SPO2 96% on 1L NC, RR 17, T 36.9 C. Pt A&OX4, confused at times. Pt denies chest pain/shortness of breath/diff breathing.

## 2023-12-15 NOTE — PROGRESS NOTE ADULT - SUBJECTIVE AND OBJECTIVE BOX
JEFFREY FREEDMAN  88y Female  MRN:[id number]    Patient is a 88y old  Female who presents with a chief complaint of s/p fall, now unable to stand amd ambulate / pubic rami fracture (14 Dec 2023 19:57)    HPI:  88-year-old female past medical history hypertension, hyperlipidemia, glaucoma, not on AC, presents to ED complaining of left hip and groin pain with inability to fully bear weight and difficulty ambulating status post unwitnessed mechanical trip and fall from standing yesterday afternoon.  Recalls event, fell while walking with a cane.  Landed on buttocks and then hit back of head on floor.  No LOC.  Was able to get under her own power.  Denies dizziness, vision changes, neck pain, chest pain, shortness of breath, abdominal pain, nausea, vomiting, urinary complaints. (14 Dec 2023 19:57)      Patient seen and evaluated at bedside. No acute events overnight except as noted.    Interval HPI: no acute events o/n     PAST MEDICAL & SURGICAL HISTORY:  HTN (Hypertension)      Hyperlipidemia      Glaucoma      Arrhythmia      Hip Fx      Polyp of Corpus Uteri      GERD (Gastroesophageal Reflux Disease)       Delivery x 2      Hip Fx-ORIF Right Hip-      Breast Cyst removal -       History of Tonsillectomy      cholecystectomy 2011          REVIEW OF SYSTEMS:  as per hpi     VITALS:  Vital Signs Last 24 Hrs  T(C): 36.7 (15 Dec 2023 11:00), Max: 36.8 (14 Dec 2023 13:44)  T(F): 98.1 (15 Dec 2023 11:00), Max: 98.3 (14 Dec 2023 13:44)  HR: 95 (15 Dec 2023 12:29) (95 - 114)  BP: 112/71 (15 Dec 2023 12:29) (111/75 - 128/73)  BP(mean): 92 (14 Dec 2023 17:44) (92 - 92)  RR: 18 (15 Dec 2023 11:00) (16 - 18)  SpO2: 98% (15 Dec 2023 12:29) (92% - 98%)    Parameters below as of 15 Dec 2023 12:29  Patient On (Oxygen Delivery Method): room air      CAPILLARY BLOOD GLUCOSE        I&O's Summary      PHYSICAL EXAM:  GENERAL: NAD, well-developed  HEAD:  Atraumatic, Normocephalic  EYES: EOMI, PERRLA, conjunctiva and sclera clear  NECK: Supple, No JVD  CHEST/LUNG: Clear to auscultation bilaterally; No wheeze  HEART: S1, S2; No murmurs, rubs, or gallops  ABDOMEN: Soft, Nontender, Nondistended; Bowel sounds present  EXTREMITIES:  2+ Peripheral Pulses, No clubbing, cyanosis, or edema  PSYCH: Normal affect  NEUROLOGY: AAOX3; non-focal  SKIN: No rashes or lesions    Consultant(s) Notes Reviewed:  [x ] YES  [ ] NO  Care Discussed with Consultants/Other Providers [ x] YES  [ ] NO    MEDS:  MEDICATIONS  (STANDING):  atorvastatin 10 milliGRAM(s) Oral at bedtime  dorzolamide 2%/timolol 0.5% Ophthalmic Solution 1 Drop(s) Both EYES two times a day  doxazosin 4 milliGRAM(s) Oral at bedtime  heparin   Injectable 5000 Unit(s) SubCutaneous every 12 hours  latanoprost 0.005% Ophthalmic Solution 1 Drop(s) Both EYES at bedtime  metoprolol tartrate 100 milliGRAM(s) Oral daily  pantoprazole    Tablet 40 milliGRAM(s) Oral before breakfast  polyethylene glycol 3350 17 Gram(s) Oral daily    MEDICATIONS  (PRN):  acetaminophen     Tablet .. 650 milliGRAM(s) Oral every 6 hours PRN Temp greater or equal to 38C (100.4F), Moderate Pain (4 - 6)  oxyCODONE    IR 2.5 milliGRAM(s) Oral two times a day PRN Severe Pain (7 - 10)    ALLERGIES:  No Known Allergies      LABS:                        10.2   7.29  )-----------( 98       ( 15 Dec 2023 07:31 )             31.3     12-15    141  |  104  |  26<H>  ----------------------------<  80  4.2   |  29  |  0.68    Ca    9.0      15 Dec 2023 07:31    TPro  5.5<L>  /  Alb  3.2<L>  /  TBili  0.5  /  DBili  x   /  AST  22  /  ALT  40  /  AlkPhos  53  12-15    PT/INR - ( 14 Dec 2023 14:57 )   PT: 13.5 sec;   INR: 1.30 ratio         PTT - ( 14 Dec 2023 14:57 )  PTT:27.7 sec      LIVER FUNCTIONS - ( 15 Dec 2023 07:31 )  Alb: 3.2 g/dL / Pro: 5.5 g/dL / ALK PHOS: 53 U/L / ALT: 40 U/L / AST: 22 U/L / GGT: x           Urinalysis Basic - ( 15 Dec 2023 07:31 )    Color: x / Appearance: x / SG: x / pH: x  Gluc: 80 mg/dL / Ketone: x  / Bili: x / Urobili: x   Blood: x / Protein: x / Nitrite: x   Leuk Esterase: x / RBC: x / WBC x   Sq Epi: x / Non Sq Epi: x / Bacteria: x      TSH: Thyroid Stimulating Hormone, Serum: 1.75 uIU/mL (12-15 @ 07:31)     < from: CT Pelvis Bony Only No Cont (23 @ 17:47) >    IMPRESSION:    Acute, mildly displaced fracture of the left superior pubic ramus/pubic   tubercle and inferior pubic ramus. Age-indeterminate left sacral fracture   (possibly acute to subacute). Minimal cortical irregularity of the right   anterior sacrum. Age-indeterminate endplate depression of the visualized   lumbar spine. If clinically indicated, follow-up MRI may be obtained for   further evaluation.    Question minimal periprosthetic lucency surrounding the right proximal   femur. Hardware loosening is not excluded. Recommend comparison to   previous outside study and follow-up as indicated.    Additional findingsas described.    --- End of Report ---      < end of copied text >   JEFFREY FREEDMAN  88y Female  MRN:[id number]    Patient is a 88y old  Female who presents with a chief complaint of s/p fall, now unable to stand amd ambulate / pubic rami fracture (14 Dec 2023 19:57)    HPI:  88-year-old female past medical history hypertension, hyperlipidemia, glaucoma, not on AC, presents to ED complaining of left hip and groin pain with inability to fully bear weight and difficulty ambulating status post unwitnessed mechanical trip and fall from standing yesterday afternoon.  Recalls event, fell while walking with a cane.  Landed on buttocks and then hit back of head on floor.  No LOC.  Was able to get under her own power.  Denies dizziness, vision changes, neck pain, chest pain, shortness of breath, abdominal pain, nausea, vomiting, urinary complaints. (14 Dec 2023 19:57)      Patient seen and evaluated at bedside. No acute events overnight except as noted.    Interval HPI: no acute events o/n     PAST MEDICAL & SURGICAL HISTORY:  HTN (Hypertension)      Hyperlipidemia      Glaucoma      Arrhythmia      Hip Fx      Polyp of Corpus Uteri      GERD (Gastroesophageal Reflux Disease)       Delivery x 2      Hip Fx-ORIF Right Hip-      Breast Cyst removal -       History of Tonsillectomy      cholecystectomy 2011          REVIEW OF SYSTEMS:  as per hpi     VITALS:  Vital Signs Last 24 Hrs  T(C): 36.7 (15 Dec 2023 11:00), Max: 36.8 (14 Dec 2023 13:44)  T(F): 98.1 (15 Dec 2023 11:00), Max: 98.3 (14 Dec 2023 13:44)  HR: 95 (15 Dec 2023 12:29) (95 - 114)  BP: 112/71 (15 Dec 2023 12:29) (111/75 - 128/73)  BP(mean): 92 (14 Dec 2023 17:44) (92 - 92)  RR: 18 (15 Dec 2023 11:00) (16 - 18)  SpO2: 98% (15 Dec 2023 12:29) (92% - 98%)    Parameters below as of 15 Dec 2023 12:29  Patient On (Oxygen Delivery Method): room air      CAPILLARY BLOOD GLUCOSE        I&O's Summary      PHYSICAL EXAM:  GENERAL: NAD, well-developed  HEAD:  Atraumatic, Normocephalic  EYES: EOMI, PERRLA, conjunctiva and sclera clear  NECK: Supple, No JVD  CHEST/LUNG: Clear to auscultation bilaterally; No wheeze  HEART: S1, S2; No murmurs, rubs, or gallops  ABDOMEN: Soft, Nontender, Nondistended; Bowel sounds present  EXTREMITIES:  2+ Peripheral Pulses, No clubbing, cyanosis, or edema  PSYCH: Normal affect  NEUROLOGY: AAOX3; non-focal  SKIN: No rashes or lesions    Consultant(s) Notes Reviewed:  [x ] YES  [ ] NO  Care Discussed with Consultants/Other Providers [ x] YES  [ ] NO    MEDS:  MEDICATIONS  (STANDING):  atorvastatin 10 milliGRAM(s) Oral at bedtime  dorzolamide 2%/timolol 0.5% Ophthalmic Solution 1 Drop(s) Both EYES two times a day  doxazosin 4 milliGRAM(s) Oral at bedtime  heparin   Injectable 5000 Unit(s) SubCutaneous every 12 hours  latanoprost 0.005% Ophthalmic Solution 1 Drop(s) Both EYES at bedtime  metoprolol tartrate 100 milliGRAM(s) Oral daily  pantoprazole    Tablet 40 milliGRAM(s) Oral before breakfast  polyethylene glycol 3350 17 Gram(s) Oral daily    MEDICATIONS  (PRN):  acetaminophen     Tablet .. 650 milliGRAM(s) Oral every 6 hours PRN Temp greater or equal to 38C (100.4F), Moderate Pain (4 - 6)  oxyCODONE    IR 2.5 milliGRAM(s) Oral two times a day PRN Severe Pain (7 - 10)    ALLERGIES:  No Known Allergies      LABS:                        10.2   7.29  )-----------( 98       ( 15 Dec 2023 07:31 )             31.3     12-15    141  |  104  |  26<H>  ----------------------------<  80  4.2   |  29  |  0.68    Ca    9.0      15 Dec 2023 07:31    TPro  5.5<L>  /  Alb  3.2<L>  /  TBili  0.5  /  DBili  x   /  AST  22  /  ALT  40  /  AlkPhos  53  12-15    PT/INR - ( 14 Dec 2023 14:57 )   PT: 13.5 sec;   INR: 1.30 ratio         PTT - ( 14 Dec 2023 14:57 )  PTT:27.7 sec      LIVER FUNCTIONS - ( 15 Dec 2023 07:31 )  Alb: 3.2 g/dL / Pro: 5.5 g/dL / ALK PHOS: 53 U/L / ALT: 40 U/L / AST: 22 U/L / GGT: x           Urinalysis Basic - ( 15 Dec 2023 07:31 )    Color: x / Appearance: x / SG: x / pH: x  Gluc: 80 mg/dL / Ketone: x  / Bili: x / Urobili: x   Blood: x / Protein: x / Nitrite: x   Leuk Esterase: x / RBC: x / WBC x   Sq Epi: x / Non Sq Epi: x / Bacteria: x      TSH: Thyroid Stimulating Hormone, Serum: 1.75 uIU/mL (12-15 @ 07:31)     < from: CT Pelvis Bony Only No Cont (23 @ 17:47) >    IMPRESSION:    Acute, mildly displaced fracture of the left superior pubic ramus/pubic   tubercle and inferior pubic ramus. Age-indeterminate left sacral fracture   (possibly acute to subacute). Minimal cortical irregularity of the right   anterior sacrum. Age-indeterminate endplate depression of the visualized   lumbar spine. If clinically indicated, follow-up MRI may be obtained for   further evaluation.    Question minimal periprosthetic lucency surrounding the right proximal   femur. Hardware loosening is not excluded. Recommend comparison to   previous outside study and follow-up as indicated.    Additional findingsas described.    --- End of Report ---      < end of copied text >

## 2023-12-15 NOTE — PROVIDER CONTACT NOTE (OTHER) - ACTION/TREATMENT ORDERED:
RADHA Scherer notified. PA to bedside. CPOX applied. EKG ordered. Metoprolol 25 mg ordered and administered.

## 2023-12-15 NOTE — PHYSICAL THERAPY INITIAL EVALUATION ADULT - ADDITIONAL COMMENTS
Patient lives alone in private home, 5 steps to enter, ~18 steps to bedroom. Patient independent with ADLs/IADLs, ambulates with cane, has grab bars in bathroom, owns a rolling walker. Does not drive (has groceries delivered).

## 2023-12-15 NOTE — PROGRESS NOTE ADULT - ASSESSMENT
87 yo female h/o htn, chol, here s/p mechanical fall with pubic rami fx     left sup/ inf pubic rami fracture   -seen by ortho in ED   conservative Rx   pain meds   PT eval   may need STR on d/c     HTN :   c/w toprol     Glaucoma :  c/w eye drops     dispo: PT eval , may need STR on d/c       Advanced care planning was discussed with patient and family.  Advanced care planning forms were reviewed and discussed as appropriate.  Differential diagnosis and plan of care discussed with patient after the evaluation.   Pain assessed and judicious use of narcotics when appropriate was discussed.  Importance of Fall prevention discussed.  Counseling on Smoking and Alcohol cessation was offered when appropriate.  Counseling on Diet, exercise, and medication compliance was done.       Approx 75 minutes spent.

## 2023-12-16 LAB
ANION GAP SERPL CALC-SCNC: 5 MMOL/L — SIGNIFICANT CHANGE UP (ref 5–17)
ANION GAP SERPL CALC-SCNC: 5 MMOL/L — SIGNIFICANT CHANGE UP (ref 5–17)
BUN SERPL-MCNC: 26 MG/DL — HIGH (ref 7–23)
BUN SERPL-MCNC: 26 MG/DL — HIGH (ref 7–23)
CALCIUM SERPL-MCNC: 8.6 MG/DL — SIGNIFICANT CHANGE UP (ref 8.4–10.5)
CALCIUM SERPL-MCNC: 8.6 MG/DL — SIGNIFICANT CHANGE UP (ref 8.4–10.5)
CHLORIDE SERPL-SCNC: 104 MMOL/L — SIGNIFICANT CHANGE UP (ref 96–108)
CHLORIDE SERPL-SCNC: 104 MMOL/L — SIGNIFICANT CHANGE UP (ref 96–108)
CO2 SERPL-SCNC: 30 MMOL/L — SIGNIFICANT CHANGE UP (ref 22–31)
CO2 SERPL-SCNC: 30 MMOL/L — SIGNIFICANT CHANGE UP (ref 22–31)
CREAT SERPL-MCNC: 0.51 MG/DL — SIGNIFICANT CHANGE UP (ref 0.5–1.3)
CREAT SERPL-MCNC: 0.51 MG/DL — SIGNIFICANT CHANGE UP (ref 0.5–1.3)
EGFR: 90 ML/MIN/1.73M2 — SIGNIFICANT CHANGE UP
EGFR: 90 ML/MIN/1.73M2 — SIGNIFICANT CHANGE UP
GLUCOSE SERPL-MCNC: 87 MG/DL — SIGNIFICANT CHANGE UP (ref 70–99)
GLUCOSE SERPL-MCNC: 87 MG/DL — SIGNIFICANT CHANGE UP (ref 70–99)
HCT VFR BLD CALC: 30.5 % — LOW (ref 34.5–45)
HCT VFR BLD CALC: 30.5 % — LOW (ref 34.5–45)
HGB BLD-MCNC: 9.5 G/DL — LOW (ref 11.5–15.5)
HGB BLD-MCNC: 9.5 G/DL — LOW (ref 11.5–15.5)
MAGNESIUM SERPL-MCNC: 1.4 MG/DL — LOW (ref 1.6–2.6)
MAGNESIUM SERPL-MCNC: 1.4 MG/DL — LOW (ref 1.6–2.6)
MCHC RBC-ENTMCNC: 29.9 PG — SIGNIFICANT CHANGE UP (ref 27–34)
MCHC RBC-ENTMCNC: 29.9 PG — SIGNIFICANT CHANGE UP (ref 27–34)
MCHC RBC-ENTMCNC: 31.1 GM/DL — LOW (ref 32–36)
MCHC RBC-ENTMCNC: 31.1 GM/DL — LOW (ref 32–36)
MCV RBC AUTO: 95.9 FL — SIGNIFICANT CHANGE UP (ref 80–100)
MCV RBC AUTO: 95.9 FL — SIGNIFICANT CHANGE UP (ref 80–100)
NRBC # BLD: 0 /100 WBCS — SIGNIFICANT CHANGE UP (ref 0–0)
NRBC # BLD: 0 /100 WBCS — SIGNIFICANT CHANGE UP (ref 0–0)
PHOSPHATE SERPL-MCNC: 2.6 MG/DL — SIGNIFICANT CHANGE UP (ref 2.5–4.5)
PHOSPHATE SERPL-MCNC: 2.6 MG/DL — SIGNIFICANT CHANGE UP (ref 2.5–4.5)
PLATELET # BLD AUTO: 99 K/UL — LOW (ref 150–400)
PLATELET # BLD AUTO: 99 K/UL — LOW (ref 150–400)
POTASSIUM SERPL-MCNC: 4 MMOL/L — SIGNIFICANT CHANGE UP (ref 3.5–5.3)
POTASSIUM SERPL-MCNC: 4 MMOL/L — SIGNIFICANT CHANGE UP (ref 3.5–5.3)
POTASSIUM SERPL-SCNC: 4 MMOL/L — SIGNIFICANT CHANGE UP (ref 3.5–5.3)
POTASSIUM SERPL-SCNC: 4 MMOL/L — SIGNIFICANT CHANGE UP (ref 3.5–5.3)
RBC # BLD: 3.18 M/UL — LOW (ref 3.8–5.2)
RBC # BLD: 3.18 M/UL — LOW (ref 3.8–5.2)
RBC # FLD: 15.4 % — HIGH (ref 10.3–14.5)
RBC # FLD: 15.4 % — HIGH (ref 10.3–14.5)
SODIUM SERPL-SCNC: 139 MMOL/L — SIGNIFICANT CHANGE UP (ref 135–145)
SODIUM SERPL-SCNC: 139 MMOL/L — SIGNIFICANT CHANGE UP (ref 135–145)
WBC # BLD: 6.25 K/UL — SIGNIFICANT CHANGE UP (ref 3.8–10.5)
WBC # BLD: 6.25 K/UL — SIGNIFICANT CHANGE UP (ref 3.8–10.5)
WBC # FLD AUTO: 6.25 K/UL — SIGNIFICANT CHANGE UP (ref 3.8–10.5)
WBC # FLD AUTO: 6.25 K/UL — SIGNIFICANT CHANGE UP (ref 3.8–10.5)

## 2023-12-16 RX ORDER — MAGNESIUM SULFATE 500 MG/ML
2 VIAL (ML) INJECTION ONCE
Refills: 0 | Status: COMPLETED | OUTPATIENT
Start: 2023-12-16 | End: 2023-12-16

## 2023-12-16 RX ADMIN — DORZOLAMIDE HYDROCHLORIDE TIMOLOL MALEATE 1 DROP(S): 20; 5 SOLUTION/ DROPS OPHTHALMIC at 06:11

## 2023-12-16 RX ADMIN — DORZOLAMIDE HYDROCHLORIDE TIMOLOL MALEATE 1 DROP(S): 20; 5 SOLUTION/ DROPS OPHTHALMIC at 17:44

## 2023-12-16 RX ADMIN — PANTOPRAZOLE SODIUM 40 MILLIGRAM(S): 20 TABLET, DELAYED RELEASE ORAL at 06:10

## 2023-12-16 RX ADMIN — Medication 4 MILLIGRAM(S): at 21:09

## 2023-12-16 RX ADMIN — HEPARIN SODIUM 5000 UNIT(S): 5000 INJECTION INTRAVENOUS; SUBCUTANEOUS at 17:43

## 2023-12-16 RX ADMIN — Medication 100 MILLIGRAM(S): at 06:10

## 2023-12-16 RX ADMIN — Medication 25 GRAM(S): at 06:46

## 2023-12-16 RX ADMIN — Medication 25 GRAM(S): at 17:43

## 2023-12-16 RX ADMIN — HEPARIN SODIUM 5000 UNIT(S): 5000 INJECTION INTRAVENOUS; SUBCUTANEOUS at 06:10

## 2023-12-16 RX ADMIN — LATANOPROST 1 DROP(S): 0.05 SOLUTION/ DROPS OPHTHALMIC; TOPICAL at 21:09

## 2023-12-16 RX ADMIN — ATORVASTATIN CALCIUM 10 MILLIGRAM(S): 80 TABLET, FILM COATED ORAL at 21:09

## 2023-12-16 NOTE — CHART NOTE - NSCHARTNOTEFT_GEN_A_CORE
**Event occurred at 21:00 **    Alerted by RN patient with elevated HR. Per nurse, patient woke up and HR went to 130's. Pt was placed on a continuous pulse oximeter showing fluctuating HR from 110's-130's. Pt seen and assessed at bedside, in no acute distress, A&Ox4. Pt states she had just woken up, denies any complaints including dizziness, HA, palpitations, CP, NVD, or pain. Pt endorses a history of Afib of which she normally takes Metoprolol 50mg BID, without AC.        Vital Signs Last 24 Hrs  T:  98.4  HR: 113   BP:  128/86)  BP(mean): --  RR: 16   SpO2: 96% (16 Dec 2023 00:00) (92% - 98%)    Parameters below as of 16 Dec 2023 00:00  Patient On (Oxygen Delivery Method): room air    Parameters below as of 16 Dec 2023 00:00  Patient On (Oxygen Delivery Method): room air    Physical Exam:  General: WN/WD NAD  Neurology: A&Ox3, nonfocal, MEZA x 4  Head:  Normocephalic, atraumatic  Respiratory: CTA B/L  CV: Irregularly Irregular rhythm, tachycardic. S1S2, no murmur  Abdominal: Soft, NT, ND no palpable mass  MSK: No edema, + peripheral pulses, FROM all 4 extremity  Labs:                          10.2   7.29  )-----------( 98       ( 15 Dec 2023 07:31 )             31.3     12-15    141  |  104  |  26<H>  ----------------------------<  80  4.2   |  29  |  0.68    Ca    9.0      15 Dec 2023 07:31    TPro  5.5<L>  /  Alb  3.2<L>  /  TBili  0.5  /  DBili  x   /  AST  22  /  ALT  40  /  AlkPhos  53  12-15        HPI:  88-year-old female past medical history hypertension, hyperlipidemia, glaucoma, not on AC, presents to ED complaining of left hip and groin pain with inability to fully bear weight and difficulty ambulating status post unwitnessed mechanical trip and fall from standing yesterday afternoon.  Recalls event, fell while walking with a cane.  Landed on buttocks and then hit back of head on floor.  No LOC.  Was able to get under her own power.  Denies dizziness, vision changes, neck pain, chest pain, shortness of breath, abdominal pain, nausea, vomiting, urinary complaints. (14 Dec 2023 19:57)    Pt now presenting with tachycardia     Assessment & Plan:  Afib RVR   >EKG obtained showing Atrial fibrillation RVR, , no previous EKG to compare  >BMP, Mg, Phos ordered, will replete lytes as needed  >Metoprolol 25 mg PO ordered for rate control. VS stable  >Consider cards c/s in the AM  >Will endorse primary team       Yi Scherer PA-C  [facility] of [facility]

## 2023-12-16 NOTE — PROGRESS NOTE ADULT - ASSESSMENT
89 yo female h/o htn, chol, afib off AC, here s/p mechanical fall with pubic rami fx     left sup/ inf pubic rami fracture   -seen by ortho in ED   conservative Rx   pain meds   PT eval     HTN :   c/w toprol     afib  with rvr  cards consulted  off AC 2/2 falls  rate control with bb      Glaucoma :  c/w eye drops       dc planning rehab        Advanced care planning was discussed with patient and family.  Advanced care planning forms were reviewed and discussed as appropriate.  Differential diagnosis and plan of care discussed with patient after the evaluation.   Pain assessed and judicious use of narcotics when appropriate was discussed.  Importance of Fall prevention discussed.  Counseling on Smoking and Alcohol cessation was offered when appropriate.  Counseling on Diet, exercise, and medication compliance was done.       Approx 75 minutes spent. 87 yo female h/o htn, chol, afib off AC, here s/p mechanical fall with pubic rami fx     left sup/ inf pubic rami fracture   -seen by ortho in ED   conservative Rx   pain meds   PT eval     HTN :   c/w toprol     afib  with rvr  cards consulted  off AC 2/2 falls  rate control with bb      Glaucoma :  c/w eye drops       dc planning rehab        Advanced care planning was discussed with patient and family.  Advanced care planning forms were reviewed and discussed as appropriate.  Differential diagnosis and plan of care discussed with patient after the evaluation.   Pain assessed and judicious use of narcotics when appropriate was discussed.  Importance of Fall prevention discussed.  Counseling on Smoking and Alcohol cessation was offered when appropriate.  Counseling on Diet, exercise, and medication compliance was done.       Approx 75 minutes spent.

## 2023-12-16 NOTE — PROGRESS NOTE ADULT - SUBJECTIVE AND OBJECTIVE BOX
JEFFREY FREEDMAN  88y Female  MRN:[id number]    Patient is a 88y old  Female who presents with a chief complaint of s/p fall, now unable to stand amd ambulate / pubic rami fracture (14 Dec 2023 19:57)    HPI:  88-year-old female past medical history hypertension, hyperlipidemia, glaucoma, afib not on AC, presents to ED complaining of left hip and groin pain with inability to fully bear weight and difficulty ambulating status post unwitnessed mechanical trip and fall from standing yesterday afternoon.  Recalls event, fell while walking with a cane.  Landed on buttocks and then hit back of head on floor.  No LOC.  Was able to get under her own power.  Denies dizziness, vision changes, neck pain, chest pain, shortness of breath, abdominal pain, nausea, vomiting, urinary complaints. (14 Dec 2023 19:57)      Patient seen and evaluated at bedside. No acute events overnight except as noted.    Interval HPI: no acute events o/n     PAST MEDICAL & SURGICAL HISTORY:  HTN (Hypertension)      Hyperlipidemia      Glaucoma      Arrhythmia      Hip Fx      Polyp of Corpus Uteri      GERD (Gastroesophageal Reflux Disease)       Delivery x 2      Hip Fx-ORIF Right Hip-      Breast Cyst removal -       History of Tonsillectomy      cholecystectomy 2011          REVIEW OF SYSTEMS:  as per hpi     VITALS:   Vital Signs Last 24 Hrs  T(C): 36.3 (16 Dec 2023 16:29), Max: 36.9 (15 Dec 2023 20:41)  T(F): 97.3 (16 Dec 2023 16:29), Max: 98.4 (15 Dec 2023 20:41)  HR: 96 (16 Dec 2023 16:29) (78 - 113)  BP: 111/72 (16 Dec 2023 16:29) (102/67 - 124/87)  BP(mean): --  RR: 18 (16 Dec 2023 16:29) (16 - 18)  SpO2: 97% (16 Dec 2023 16:29) (96% - 99%)    Parameters below as of 16 Dec 2023 16:29  Patient On (Oxygen Delivery Method): nasal cannula  O2 Flow (L/min): 2        PHYSICAL EXAM:  GENERAL: NAD, well-developed  HEAD:  Atraumatic, Normocephalic  EYES: EOMI, PERRLA, conjunctiva and sclera clear  NECK: Supple, No JVD  CHEST/LUNG: Clear to auscultation bilaterally; No wheeze  HEART: S1, S2; No murmurs, rubs, or gallops  ABDOMEN: Soft, Nontender, Nondistended; Bowel sounds present  EXTREMITIES:  2+ Peripheral Pulses, No clubbing, cyanosis, or edema  PSYCH: Normal affect  NEUROLOGY: AAOX3; non-focal  SKIN: No rashes or lesions    Consultant(s) Notes Reviewed:  [x ] YES  [ ] NO  Care Discussed with Consultants/Other Providers [ x] YES  [ ] NO    MEDS:   MEDICATIONS  (STANDING):  atorvastatin 10 milliGRAM(s) Oral at bedtime  dorzolamide 2%/timolol 0.5% Ophthalmic Solution 1 Drop(s) Both EYES two times a day  doxazosin 4 milliGRAM(s) Oral at bedtime  heparin   Injectable 5000 Unit(s) SubCutaneous every 12 hours  latanoprost 0.005% Ophthalmic Solution 1 Drop(s) Both EYES at bedtime  metoprolol tartrate 100 milliGRAM(s) Oral daily  pantoprazole    Tablet 40 milliGRAM(s) Oral before breakfast  polyethylene glycol 3350 17 Gram(s) Oral daily    MEDICATIONS  (PRN):  acetaminophen     Tablet .. 650 milliGRAM(s) Oral every 6 hours PRN Temp greater or equal to 38C (100.4F), Moderate Pain (4 - 6)  oxyCODONE    IR 2.5 milliGRAM(s) Oral two times a day PRN Severe Pain (7 - 10)      ALLERGIES:  No Known Allergies      LABS:                          9.5    6.25  )-----------( 99       ( 16 Dec 2023 05:55 )             30.5   -    139  |  104  |  26<H>  ----------------------------<  87  4.0   |  30  |  0.51    Ca    8.6      16 Dec 2023 05:55  Phos  2.6     -  Mg     1.4         TPro  5.5<L>  /  Alb  3.2<L>  /  TBili  0.5  /  DBili  x   /  AST  22  /  ALT  40  /  AlkPhos  53  12-15      TSH: Thyroid Stimulating Hormone, Serum: 1.75 uIU/mL (12-15 @ 07:31)     < from: CT Pelvis Bony Only No Cont (23 @ 17:47) >    IMPRESSION:    Acute, mildly displaced fracture of the left superior pubic ramus/pubic   tubercle and inferior pubic ramus. Age-indeterminate left sacral fracture   (possibly acute to subacute). Minimal cortical irregularity of the right   anterior sacrum. Age-indeterminate endplate depression of the visualized   lumbar spine. If clinically indicated, follow-up MRI may be obtained for   further evaluation.    Question minimal periprosthetic lucency surrounding the right proximal   femur. Hardware loosening is not excluded. Recommend comparison to   previous outside study and follow-up as indicated.    Additional findingsas described.    --- End of Report ---      < end of copied text >   JEFFREY FREEDMAN  88y Female  MRN:[id number]    Patient is a 88y old  Female who presents with a chief complaint of s/p fall, now unable to stand amd ambulate / pubic rami fracture (14 Dec 2023 19:57)    HPI:  88-year-old female past medical history hypertension, hyperlipidemia, glaucoma, afib not on AC, presents to ED complaining of left hip and groin pain with inability to fully bear weight and difficulty ambulating status post unwitnessed mechanical trip and fall from standing yesterday afternoon.  Recalls event, fell while walking with a cane.  Landed on buttocks and then hit back of head on floor.  No LOC.  Was able to get under her own power.  Denies dizziness, vision changes, neck pain, chest pain, shortness of breath, abdominal pain, nausea, vomiting, urinary complaints. (14 Dec 2023 19:57)      Patient seen and evaluated at bedside. No acute events overnight except as noted.    Interval HPI: no acute events o/n     PAST MEDICAL & SURGICAL HISTORY:  HTN (Hypertension)      Hyperlipidemia      Glaucoma      Arrhythmia      Hip Fx      Polyp of Corpus Uteri      GERD (Gastroesophageal Reflux Disease)       Delivery x 2      Hip Fx-ORIF Right Hip-      Breast Cyst removal -       History of Tonsillectomy      cholecystectomy 2011          REVIEW OF SYSTEMS:  as per hpi     VITALS:   Vital Signs Last 24 Hrs  T(C): 36.3 (16 Dec 2023 16:29), Max: 36.9 (15 Dec 2023 20:41)  T(F): 97.3 (16 Dec 2023 16:29), Max: 98.4 (15 Dec 2023 20:41)  HR: 96 (16 Dec 2023 16:29) (78 - 113)  BP: 111/72 (16 Dec 2023 16:29) (102/67 - 124/87)  BP(mean): --  RR: 18 (16 Dec 2023 16:29) (16 - 18)  SpO2: 97% (16 Dec 2023 16:29) (96% - 99%)    Parameters below as of 16 Dec 2023 16:29  Patient On (Oxygen Delivery Method): nasal cannula  O2 Flow (L/min): 2        PHYSICAL EXAM:  GENERAL: NAD, well-developed  HEAD:  Atraumatic, Normocephalic  EYES: EOMI, PERRLA, conjunctiva and sclera clear  NECK: Supple, No JVD  CHEST/LUNG: Clear to auscultation bilaterally; No wheeze  HEART: S1, S2; No murmurs, rubs, or gallops  ABDOMEN: Soft, Nontender, Nondistended; Bowel sounds present  EXTREMITIES:  2+ Peripheral Pulses, No clubbing, cyanosis, or edema  PSYCH: Normal affect  NEUROLOGY: AAOX3; non-focal  SKIN: No rashes or lesions    Consultant(s) Notes Reviewed:  [x ] YES  [ ] NO  Care Discussed with Consultants/Other Providers [ x] YES  [ ] NO    MEDS:   MEDICATIONS  (STANDING):  atorvastatin 10 milliGRAM(s) Oral at bedtime  dorzolamide 2%/timolol 0.5% Ophthalmic Solution 1 Drop(s) Both EYES two times a day  doxazosin 4 milliGRAM(s) Oral at bedtime  heparin   Injectable 5000 Unit(s) SubCutaneous every 12 hours  latanoprost 0.005% Ophthalmic Solution 1 Drop(s) Both EYES at bedtime  metoprolol tartrate 100 milliGRAM(s) Oral daily  pantoprazole    Tablet 40 milliGRAM(s) Oral before breakfast  polyethylene glycol 3350 17 Gram(s) Oral daily    MEDICATIONS  (PRN):  acetaminophen     Tablet .. 650 milliGRAM(s) Oral every 6 hours PRN Temp greater or equal to 38C (100.4F), Moderate Pain (4 - 6)  oxyCODONE    IR 2.5 milliGRAM(s) Oral two times a day PRN Severe Pain (7 - 10)      ALLERGIES:  No Known Allergies      LABS:                          9.5    6.25  )-----------( 99       ( 16 Dec 2023 05:55 )             30.5   -    139  |  104  |  26<H>  ----------------------------<  87  4.0   |  30  |  0.51    Ca    8.6      16 Dec 2023 05:55  Phos  2.6     -  Mg     1.4         TPro  5.5<L>  /  Alb  3.2<L>  /  TBili  0.5  /  DBili  x   /  AST  22  /  ALT  40  /  AlkPhos  53  12-15      TSH: Thyroid Stimulating Hormone, Serum: 1.75 uIU/mL (12-15 @ 07:31)     < from: CT Pelvis Bony Only No Cont (23 @ 17:47) >    IMPRESSION:    Acute, mildly displaced fracture of the left superior pubic ramus/pubic   tubercle and inferior pubic ramus. Age-indeterminate left sacral fracture   (possibly acute to subacute). Minimal cortical irregularity of the right   anterior sacrum. Age-indeterminate endplate depression of the visualized   lumbar spine. If clinically indicated, follow-up MRI may be obtained for   further evaluation.    Question minimal periprosthetic lucency surrounding the right proximal   femur. Hardware loosening is not excluded. Recommend comparison to   previous outside study and follow-up as indicated.    Additional findingsas described.    --- End of Report ---      < end of copied text >

## 2023-12-17 DIAGNOSIS — S32.599A OTHER SPECIFIED FRACTURE OF UNSPECIFIED PUBIS, INITIAL ENCOUNTER FOR CLOSED FRACTURE: ICD-10-CM

## 2023-12-17 DIAGNOSIS — I10 ESSENTIAL (PRIMARY) HYPERTENSION: ICD-10-CM

## 2023-12-17 DIAGNOSIS — I48.19 OTHER PERSISTENT ATRIAL FIBRILLATION: ICD-10-CM

## 2023-12-17 LAB
ANION GAP SERPL CALC-SCNC: 3 MMOL/L — LOW (ref 5–17)
ANION GAP SERPL CALC-SCNC: 3 MMOL/L — LOW (ref 5–17)
BUN SERPL-MCNC: 29 MG/DL — HIGH (ref 7–23)
BUN SERPL-MCNC: 29 MG/DL — HIGH (ref 7–23)
CALCIUM SERPL-MCNC: 8.6 MG/DL — SIGNIFICANT CHANGE UP (ref 8.4–10.5)
CALCIUM SERPL-MCNC: 8.6 MG/DL — SIGNIFICANT CHANGE UP (ref 8.4–10.5)
CHLORIDE SERPL-SCNC: 105 MMOL/L — SIGNIFICANT CHANGE UP (ref 96–108)
CHLORIDE SERPL-SCNC: 105 MMOL/L — SIGNIFICANT CHANGE UP (ref 96–108)
CO2 SERPL-SCNC: 32 MMOL/L — HIGH (ref 22–31)
CO2 SERPL-SCNC: 32 MMOL/L — HIGH (ref 22–31)
CREAT SERPL-MCNC: 0.54 MG/DL — SIGNIFICANT CHANGE UP (ref 0.5–1.3)
CREAT SERPL-MCNC: 0.54 MG/DL — SIGNIFICANT CHANGE UP (ref 0.5–1.3)
EGFR: 88 ML/MIN/1.73M2 — SIGNIFICANT CHANGE UP
EGFR: 88 ML/MIN/1.73M2 — SIGNIFICANT CHANGE UP
GLUCOSE SERPL-MCNC: 91 MG/DL — SIGNIFICANT CHANGE UP (ref 70–99)
GLUCOSE SERPL-MCNC: 91 MG/DL — SIGNIFICANT CHANGE UP (ref 70–99)
HCT VFR BLD CALC: 30.3 % — LOW (ref 34.5–45)
HCT VFR BLD CALC: 30.3 % — LOW (ref 34.5–45)
HGB BLD-MCNC: 9.6 G/DL — LOW (ref 11.5–15.5)
HGB BLD-MCNC: 9.6 G/DL — LOW (ref 11.5–15.5)
MAGNESIUM SERPL-MCNC: 1.9 MG/DL — SIGNIFICANT CHANGE UP (ref 1.6–2.6)
MAGNESIUM SERPL-MCNC: 1.9 MG/DL — SIGNIFICANT CHANGE UP (ref 1.6–2.6)
MCHC RBC-ENTMCNC: 30.8 PG — SIGNIFICANT CHANGE UP (ref 27–34)
MCHC RBC-ENTMCNC: 30.8 PG — SIGNIFICANT CHANGE UP (ref 27–34)
MCHC RBC-ENTMCNC: 31.7 GM/DL — LOW (ref 32–36)
MCHC RBC-ENTMCNC: 31.7 GM/DL — LOW (ref 32–36)
MCV RBC AUTO: 97.1 FL — SIGNIFICANT CHANGE UP (ref 80–100)
MCV RBC AUTO: 97.1 FL — SIGNIFICANT CHANGE UP (ref 80–100)
NRBC # BLD: 0 /100 WBCS — SIGNIFICANT CHANGE UP (ref 0–0)
NRBC # BLD: 0 /100 WBCS — SIGNIFICANT CHANGE UP (ref 0–0)
PHOSPHATE SERPL-MCNC: 2.7 MG/DL — SIGNIFICANT CHANGE UP (ref 2.5–4.5)
PHOSPHATE SERPL-MCNC: 2.7 MG/DL — SIGNIFICANT CHANGE UP (ref 2.5–4.5)
PLATELET # BLD AUTO: 126 K/UL — LOW (ref 150–400)
PLATELET # BLD AUTO: 126 K/UL — LOW (ref 150–400)
POTASSIUM SERPL-MCNC: 4.6 MMOL/L — SIGNIFICANT CHANGE UP (ref 3.5–5.3)
POTASSIUM SERPL-MCNC: 4.6 MMOL/L — SIGNIFICANT CHANGE UP (ref 3.5–5.3)
POTASSIUM SERPL-SCNC: 4.6 MMOL/L — SIGNIFICANT CHANGE UP (ref 3.5–5.3)
POTASSIUM SERPL-SCNC: 4.6 MMOL/L — SIGNIFICANT CHANGE UP (ref 3.5–5.3)
RBC # BLD: 3.12 M/UL — LOW (ref 3.8–5.2)
RBC # BLD: 3.12 M/UL — LOW (ref 3.8–5.2)
RBC # FLD: 15.4 % — HIGH (ref 10.3–14.5)
RBC # FLD: 15.4 % — HIGH (ref 10.3–14.5)
SODIUM SERPL-SCNC: 140 MMOL/L — SIGNIFICANT CHANGE UP (ref 135–145)
SODIUM SERPL-SCNC: 140 MMOL/L — SIGNIFICANT CHANGE UP (ref 135–145)
WBC # BLD: 5.67 K/UL — SIGNIFICANT CHANGE UP (ref 3.8–10.5)
WBC # BLD: 5.67 K/UL — SIGNIFICANT CHANGE UP (ref 3.8–10.5)
WBC # FLD AUTO: 5.67 K/UL — SIGNIFICANT CHANGE UP (ref 3.8–10.5)
WBC # FLD AUTO: 5.67 K/UL — SIGNIFICANT CHANGE UP (ref 3.8–10.5)

## 2023-12-17 RX ORDER — METOPROLOL TARTRATE 50 MG
50 TABLET ORAL THREE TIMES A DAY
Refills: 0 | Status: DISCONTINUED | OUTPATIENT
Start: 2023-12-17 | End: 2023-12-19

## 2023-12-17 RX ADMIN — PANTOPRAZOLE SODIUM 40 MILLIGRAM(S): 20 TABLET, DELAYED RELEASE ORAL at 05:23

## 2023-12-17 RX ADMIN — HEPARIN SODIUM 5000 UNIT(S): 5000 INJECTION INTRAVENOUS; SUBCUTANEOUS at 18:52

## 2023-12-17 RX ADMIN — HEPARIN SODIUM 5000 UNIT(S): 5000 INJECTION INTRAVENOUS; SUBCUTANEOUS at 05:23

## 2023-12-17 RX ADMIN — Medication 100 MILLIGRAM(S): at 05:23

## 2023-12-17 RX ADMIN — Medication 50 MILLIGRAM(S): at 16:42

## 2023-12-17 RX ADMIN — DORZOLAMIDE HYDROCHLORIDE TIMOLOL MALEATE 1 DROP(S): 20; 5 SOLUTION/ DROPS OPHTHALMIC at 05:23

## 2023-12-17 RX ADMIN — POLYETHYLENE GLYCOL 3350 17 GRAM(S): 17 POWDER, FOR SOLUTION ORAL at 13:13

## 2023-12-17 RX ADMIN — ATORVASTATIN CALCIUM 10 MILLIGRAM(S): 80 TABLET, FILM COATED ORAL at 21:35

## 2023-12-17 RX ADMIN — DORZOLAMIDE HYDROCHLORIDE TIMOLOL MALEATE 1 DROP(S): 20; 5 SOLUTION/ DROPS OPHTHALMIC at 18:52

## 2023-12-17 RX ADMIN — Medication 4 MILLIGRAM(S): at 21:35

## 2023-12-17 RX ADMIN — LATANOPROST 1 DROP(S): 0.05 SOLUTION/ DROPS OPHTHALMIC; TOPICAL at 21:35

## 2023-12-17 RX ADMIN — Medication 50 MILLIGRAM(S): at 21:35

## 2023-12-17 NOTE — PROGRESS NOTE ADULT - ASSESSMENT
89 yo female h/o htn, chol, afib off AC, here s/p mechanical fall with pubic rami fx     left sup/ inf pubic rami fracture   -seen by ortho in ED   conservative Rx   pain meds   PT eval     HTN :   c/w toprol     afib  with rvr  cards consult f/u  off AC 2/2 falls  rate control with bb as ordered       Glaucoma :  c/w eye drops       dc planning rehab        Advanced care planning was discussed with patient and family.  Advanced care planning forms were reviewed and discussed as appropriate.  Differential diagnosis and plan of care discussed with patient after the evaluation.   Pain assessed and judicious use of narcotics when appropriate was discussed.  Importance of Fall prevention discussed.  Counseling on Smoking and Alcohol cessation was offered when appropriate.  Counseling on Diet, exercise, and medication compliance was done.       Approx 75 minutes spent. 87 yo female h/o htn, chol, afib off AC, here s/p mechanical fall with pubic rami fx     left sup/ inf pubic rami fracture   -seen by ortho in ED   conservative Rx   pain meds   PT eval     HTN :   c/w toprol     afib  with rvr  cards consult f/u  off AC 2/2 falls  rate control with bb as ordered       Glaucoma :  c/w eye drops       dc planning rehab        Advanced care planning was discussed with patient and family.  Advanced care planning forms were reviewed and discussed as appropriate.  Differential diagnosis and plan of care discussed with patient after the evaluation.   Pain assessed and judicious use of narcotics when appropriate was discussed.  Importance of Fall prevention discussed.  Counseling on Smoking and Alcohol cessation was offered when appropriate.  Counseling on Diet, exercise, and medication compliance was done.       Approx 75 minutes spent.

## 2023-12-17 NOTE — CONSULT NOTE ADULT - SUBJECTIVE AND OBJECTIVE BOX
CHIEF COMPLAINT:    HISTORY OF PRESENT ILLNESS:    PAST MEDICAL & SURGICAL HISTORY:  HTN (Hypertension)      Hyperlipidemia      Glaucoma      Arrhythmia      Hip Fx      Polyp of Corpus Uteri      GERD (Gastroesophageal Reflux Disease)       Delivery x 2      Hip Fx-ORIF Right Hip-      Breast Cyst removal -       History of Tonsillectomy      cholecystectomy 2011              MEDICATIONS:  doxazosin 4 milliGRAM(s) Oral at bedtime  heparin   Injectable 5000 Unit(s) SubCutaneous every 12 hours  metoprolol tartrate 100 milliGRAM(s) Oral daily        acetaminophen     Tablet .. 650 milliGRAM(s) Oral every 6 hours PRN  oxyCODONE    IR 2.5 milliGRAM(s) Oral two times a day PRN    pantoprazole    Tablet 40 milliGRAM(s) Oral before breakfast  polyethylene glycol 3350 17 Gram(s) Oral daily    atorvastatin 10 milliGRAM(s) Oral at bedtime    dorzolamide 2%/timolol 0.5% Ophthalmic Solution 1 Drop(s) Both EYES two times a day  latanoprost 0.005% Ophthalmic Solution 1 Drop(s) Both EYES at bedtime      FAMILY HISTORY:      SOCIAL HISTORY:    [ ] Non-smoker  [ ] Smoker  [ ] Alcohol    Allergies    No Known Allergies    Intolerances    	    REVIEW OF SYSTEMS:  CONSTITUTIONAL: No fever, weight loss, or fatigue  EYES: No eye pain, visual disturbances, or discharge  ENMT:  No difficulty hearing, tinnitus, vertigo; No sinus or throat pain  NECK: No pain or stiffness  RESPIRATORY: No cough, wheezing, chills or hemoptysis; No Shortness of Breath  CARDIOVASCULAR: No chest pain, palpitations, passing out, dizziness, or leg swelling  GASTROINTESTINAL: No abdominal or epigastric pain. No nausea, vomiting, or hematemesis; No diarrhea or constipation. No melena or hematochezia.  GENITOURINARY: No dysuria, frequency, hematuria, or incontinence  NEUROLOGICAL: No headaches, memory loss, loss of strength, numbness, or tremors  SKIN: No itching, burning, rashes, or lesions   LYMPH Nodes: No enlarged glands  ENDOCRINE: No heat or cold intolerance; No hair loss  MUSCULOSKELETAL: No joint pain or swelling; No muscle, back, or extremity pain  PSYCHIATRIC: No depression, anxiety, mood swings, or difficulty sleeping  HEME/LYMPH: No easy bruising, or bleeding gums  ALLERY AND IMMUNOLOGIC: No hives or eczema	    [ ] All others negative	  [ ] Unable to obtain    PHYSICAL EXAM:  T(C): 36.4 (12-17-23 @ 04:45), Max: 36.6 (23 @ 00:09)  HR: 99 (23 @ 04:45) (89 - 102)  BP: 118/83 (23 @ 04:45) (96/63 - 118/83)  RR: 18 (23 @ 04:45) (18 - 18)  SpO2: 96% (23 @ 04:45) (96% - 99%)  Wt(kg): --  I&O's Summary    16 Dec 2023 07:01  -  17 Dec 2023 07:00  --------------------------------------------------------  IN: 1355 mL / OUT: 0 mL / NET: 1355 mL        Appearance: Normal	  HEENT:   Normal oral mucosa, PERRL, EOMI	  Lymphatic: No lymphadenopathy  Cardiovascular: Normal S1 S2, No JVD, No murmurs, No edema  Respiratory: Lungs clear to auscultation	  Psychiatry: A & O x 3, Mood & affect appropriate  Gastrointestinal:  Soft, Non-tender, + BS	  Skin: No rashes, No ecchymoses, No cyanosis	  Neurologic: Non-focal  Extremities: Normal range of motion, No clubbing, cyanosis or edema  Vascular: Peripheral pulses palpable 2+ bilaterally    TELEMETRY: 	    ECG:  	  RADIOLOGY:  OTHER: 	  	  LABS:	 	    CARDIAC MARKERS:                                  9.6    5.67  )-----------( 126      ( 17 Dec 2023 09:54 )             30.3         140  |  105  |  29<H>  ----------------------------<  91  4.6   |  32<H>  |  0.54    Ca    8.6      17 Dec 2023 09:54  Phos  2.7       Mg     1.9           proBNP:   Lipid Profile:   HgA1c:   TSH:            CHIEF COMPLAINT:    HISTORY OF PRESENT ILLNESS:   88-year-old female past medical history hypertension, hyperlipidemia, glaucoma, not on AC, presents to ED complaining of left hip and groin pain with inability to fully bear weight and difficulty ambulating status post unwitnessed mechanical trip and fall from standing yesterday afternoon.  Recalls event, fell while walking with a cane.  Landed on buttocks and then hit back of head on floor.  No LOC.  Was able to get under her own power.  Denies dizziness, vision changes, neck pain, chest pain, shortness of breath, abdominal pain, nausea, vomiting, urinary complaints.      PAST MEDICAL & SURGICAL HISTORY:  HTN (Hypertension)      Hyperlipidemia      Glaucoma      Arrhythmia      Hip Fx      Polyp of Corpus Uteri      GERD (Gastroesophageal Reflux Disease)       Delivery x 2      Hip Fx-ORIF Right Hip-      Breast Cyst removal -       History of Tonsillectomy      cholecystectomy 2011              MEDICATIONS:  doxazosin 4 milliGRAM(s) Oral at bedtime  heparin   Injectable 5000 Unit(s) SubCutaneous every 12 hours  metoprolol tartrate 100 milliGRAM(s) Oral daily        acetaminophen     Tablet .. 650 milliGRAM(s) Oral every 6 hours PRN  oxyCODONE    IR 2.5 milliGRAM(s) Oral two times a day PRN    pantoprazole    Tablet 40 milliGRAM(s) Oral before breakfast  polyethylene glycol 3350 17 Gram(s) Oral daily    atorvastatin 10 milliGRAM(s) Oral at bedtime    dorzolamide 2%/timolol 0.5% Ophthalmic Solution 1 Drop(s) Both EYES two times a day  latanoprost 0.005% Ophthalmic Solution 1 Drop(s) Both EYES at bedtime      FAMILY HISTORY:      SOCIAL HISTORY:    [ ] Non-smoker  [ ] Smoker  [ ] Alcohol    Allergies    No Known Allergies    Intolerances    	    REVIEW OF SYSTEMS:  CONSTITUTIONAL: No fever, weight loss, + fatigue  EYES: No eye pain, visual disturbances, or discharge  ENMT:  No difficulty hearing, tinnitus, vertigo; No sinus or throat pain  NECK: No pain or stiffness  RESPIRATORY: No cough, wheezing, chills or hemoptysis; No Shortness of Breath  CARDIOVASCULAR: No chest pain, palpitations, passing out, dizziness, or leg swelling  GASTROINTESTINAL: No abdominal or epigastric pain. No nausea, vomiting, or hematemesis; No diarrhea or constipation. No melena or hematochezia.  GENITOURINARY: No dysuria, frequency, hematuria, or incontinence  NEUROLOGICAL: No headaches, memory loss, loss of strength, numbness, or tremors  SKIN: No itching, burning, rashes, or lesions   LYMPH Nodes: No enlarged glands  ENDOCRINE: No heat or cold intolerance; No hair loss  MUSCULOSKELETAL: No joint pain or swelling; No muscle, back, or extremity pain  PSYCHIATRIC: No depression, anxiety, mood swings, or difficulty sleeping  HEME/LYMPH: No easy bruising, or bleeding gums  ALLERY AND IMMUNOLOGIC: No hives or eczema	    [ ] All others negative	  [ ] Unable to obtain    PHYSICAL EXAM:  T(C): 36.4 (23 @ 04:45), Max: 36.6 (23 @ 00:09)  HR: 99 (23 @ 04:45) (89 - 102)  BP: 118/83 (23 @ 04:45) (96/63 - 118/83)  RR: 18 (23 @ 04:45) (18 - 18)  SpO2: 96% (23 @ 04:45) (96% - 99%)  Wt(kg): --  I&O's Summary    16 Dec 2023 07:01  -  17 Dec 2023 07:00  --------------------------------------------------------  IN: 1355 mL / OUT: 0 mL / NET: 1355 mL        Appearance: NAD elderly very frail appearing    HEENT:  Dry oral mucosa, PERRL, EOMI	  Lymphatic: No lymphadenopathy  Cardiovascular: Irregular S1 S2, No JVD, No murmurs, No edema  Respiratory: decreased bs  Psychiatry: A & O x 3, Mood & affect appropriate  Gastrointestinal:  Soft, Non-tender, + BS	  Skin: No rashes, No ecchymoses, No cyanosis	  Neurologic: Non-focal  Extremities: Normal range of motion, No clubbing, cyanosis or edema  Vascular: Peripheral pulses palpable 2+ bilaterally    TELEMETRY: 	    ECG:  	Afib Inferior infarct  RADIOLOGY: < from: Xray Pelvis 3 Views (23 @ 17:15) >    ACC: [id number] EXAM:  XR PELVIS COMPLETE MIN 3 VIEWS   ORDERED BY:  ABIMAEL PENG     PROCEDURE DATE:  2023          INTERPRETATION:  CLINICAL INDICATION: Pubic rami fractures.    TECHNIQUE: 2 views of the pelvis.    COMPARISON: Compared to hip x-ray and pelvis CT from the same day.    FINDINGS: Bowel gas limiting evaluation of bony and soft tissue detail.   Internal fixation of the right proximal femur. Fractures of the left   superior and inferior pubic rami again noted. Difficult to assess left   sacral fracture on this study. Degenerative changes of the visualized   spine, pubic symphysis, sacroiliac and hip joints.    IMPRESSION:    Known left superior and inferior pubic rami fractures, better   characterized on concurrent pelvic CT.    --- End of Report ---      < end of copied text >  < from: Xray Femur 2 Views, Left (23 @ 16:33) >    ACC: [id number] EXAM:  XR HIP WITH PELV 2-3V LT   ORDERED BY: MARCE KENT     ACC: [id number] EXAM:  XR FEMUR 2 VIEWS LT   ORDERED BY: MARCE KENT     PROCEDURE DATE:  2023          INTERPRETATION:  Clinical indications: Left-sided hip pain status post   fall.    AP view the pelvis, 2 views of the left hip, and 2 views of the left   femur were performed.    FINDINGS:    There are acute left superior and inferior pubic rami fractures. There is   severe left hip arthrosis. Subarticular sclerosis along the superior   aspect of the left femoral head is suggestive of avascular necrosis.   There is no evidence of subarticular collapse. Patient is status post   open reduction internal fixation of the right hip. There is lower lumbar   spondylosis. There is moderate tricompartmental arthrosis of the knee.   Atherosclerotic disease.    IMPRESSION:    Acute left superior and inferior pubic rami fractures.    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   Severe left hip arthrosis. No evidence of subarticular collapse.    --- End of Report ---    < end of copied text >  < from: CT 3D Reconstruct w/o Workstation (23 @ 17:47) >    ACC: [id number] EXAM:  CT 3D RECONSTRUCT [facility]   ORDERED BY: MARCE KENT     ACC: [id number] EXAM:  CT PELVIS BONY ONLY   ORDERED BY: MARCE KENT     PROCEDURE DATE:  2023          INTERPRETATION:  CLINICAL INDICATION: L pubic ramifrxs on xr    TECHNIQUE: CT axial images of the pelvis were obtained without   intravenous contrast. Coronal and sagittal reformatted images were also   obtained. 3-D images were obtained from a separate workstation.    CONTRAST/COMPLICATIONS:  IV Contrast: NONE  Complications: None reported at time of study completion    COMPARISON: XR: 2023. CT: None. MR: None.    FINDINGS: Evaluation of soft tissue is limited without intravenous   contrast.    Bones: Decreased bone mineralization. Internal fixation of the right   femoral deformity via helical blade, short intramedullary leona and distal   interlocking screw. Question minimal lucency surrounding the femoral   intramedullary leona.    Acute, mildly displaced fracture of the left superior pubic ramus/pubic   tubercle and inferior pubic ramus. No dislocation.  Slight contour deformity, lucency and sclerosis of the left sacrum,   suggesting insufficiency fracture (possibly acute to subacute). Minimal   cortical irregularity of the right anterior sacrum.    Age indeterminate endplate depression at the L4 on L5. Grade 1   anterolisthesis of L4 on L5 and L5 on S1. Degenerative changes of the   visualized lower lumbar spine, pubic symphysis, sacroiliac and hip joints.    Soft tissue: Diffuse stranding at the visualized soft tissue. Asymmetric   enlargement of the left gluteus/obturator internus/thigh muscles with   surrounding stranding, suggesting edema/hematoma. Diffuse muscle bulk   loss with fatty replacement. No significant hip joint effusion.    Additional: Colon diverticulosis. Bilateral perinephric stranding. Small   free fluid. Atherosclerosis.    IMPRESSION:    Acute, mildly displaced fracture of the left superior pubic ramus/pubic   tubercle and inferior pubic ramus. Age-indeterminate left sacral fracture   (possibly acute to subacute). Minimal cortical irregularity of the right   anterior sacrum. Age-indeterminate endplate depression of the visualized   lumbar spine. If clinically indicated, follow-up MRI may be obtained for   further evaluation.    Question minimal periprosthetic lucency surrounding the right proximal   femur. Hardware loosening is not excluded. Recommend comparison to   previous outside study and follow-up as indicated.    Additional findingsas described.    --- End of Report ---           SHERIDAN ARNDT MD; Resident Radiologist  This document has been electronically signed.  KANDICE NICE MD; Attending Radiologist    < end of copied text >    OTHER: 	  	  LABS:	 	    CARDIAC MARKERS:                                  9.6    5.67  )-----------( 126      ( 17 Dec 2023 09:54 )             30.3     12-    140  |  105  |  29<H>  ----------------------------<  91  4.6   |  32<H>  |  0.54    Ca    8.6      17 Dec 2023 09:54  Phos  2.7     12-  Mg     1.9     -      proBNP:   Lipid Profile:   HgA1c:   TSH:            CHIEF COMPLAINT:    HISTORY OF PRESENT ILLNESS:   88-year-old female past medical history hypertension, hyperlipidemia, glaucoma, not on AC, presents to ED complaining of left hip and groin pain with inability to fully bear weight and difficulty ambulating status post unwitnessed mechanical trip and fall from standing yesterday afternoon.  Recalls event, fell while walking with a cane.  Landed on buttocks and then hit back of head on floor.  No LOC.  Was able to get under her own power.  Denies dizziness, vision changes, neck pain, chest pain, shortness of breath, abdominal pain, nausea, vomiting, urinary complaints.      PAST MEDICAL & SURGICAL HISTORY:  HTN (Hypertension)      Hyperlipidemia      Glaucoma      Arrhythmia      Hip Fx      Polyp of Corpus Uteri      GERD (Gastroesophageal Reflux Disease)       Delivery x 2      Hip Fx-ORIF Right Hip-      Breast Cyst removal -       History of Tonsillectomy      cholecystectomy 2011              MEDICATIONS:  doxazosin 4 milliGRAM(s) Oral at bedtime  heparin   Injectable 5000 Unit(s) SubCutaneous every 12 hours  metoprolol tartrate 100 milliGRAM(s) Oral daily        acetaminophen     Tablet .. 650 milliGRAM(s) Oral every 6 hours PRN  oxyCODONE    IR 2.5 milliGRAM(s) Oral two times a day PRN    pantoprazole    Tablet 40 milliGRAM(s) Oral before breakfast  polyethylene glycol 3350 17 Gram(s) Oral daily    atorvastatin 10 milliGRAM(s) Oral at bedtime    dorzolamide 2%/timolol 0.5% Ophthalmic Solution 1 Drop(s) Both EYES two times a day  latanoprost 0.005% Ophthalmic Solution 1 Drop(s) Both EYES at bedtime      FAMILY HISTORY:      SOCIAL HISTORY:    [ ] Non-smoker  [ ] Smoker  [ ] Alcohol    Allergies    No Known Allergies    Intolerances    	    REVIEW OF SYSTEMS:  CONSTITUTIONAL: No fever, weight loss, + fatigue  EYES: No eye pain, visual disturbances, or discharge  ENMT:  No difficulty hearing, tinnitus, vertigo; No sinus or throat pain  NECK: No pain or stiffness  RESPIRATORY: No cough, wheezing, chills or hemoptysis; No Shortness of Breath  CARDIOVASCULAR: No chest pain, palpitations, passing out, dizziness, or leg swelling  GASTROINTESTINAL: No abdominal or epigastric pain. No nausea, vomiting, or hematemesis; No diarrhea or constipation. No melena or hematochezia.  GENITOURINARY: No dysuria, frequency, hematuria, or incontinence  NEUROLOGICAL: No headaches, memory loss, loss of strength, numbness, or tremors  SKIN: No itching, burning, rashes, or lesions   LYMPH Nodes: No enlarged glands  ENDOCRINE: No heat or cold intolerance; No hair loss  MUSCULOSKELETAL: No joint pain or swelling; No muscle, back, or extremity pain  PSYCHIATRIC: No depression, anxiety, mood swings, or difficulty sleeping  HEME/LYMPH: No easy bruising, or bleeding gums  ALLERY AND IMMUNOLOGIC: No hives or eczema	    [ ] All others negative	  [ ] Unable to obtain    PHYSICAL EXAM:  T(C): 36.4 (23 @ 04:45), Max: 36.6 (23 @ 00:09)  HR: 99 (23 @ 04:45) (89 - 102)  BP: 118/83 (23 @ 04:45) (96/63 - 118/83)  RR: 18 (23 @ 04:45) (18 - 18)  SpO2: 96% (23 @ 04:45) (96% - 99%)  Wt(kg): --  I&O's Summary    16 Dec 2023 07:01  -  17 Dec 2023 07:00  --------------------------------------------------------  IN: 1355 mL / OUT: 0 mL / NET: 1355 mL        Appearance: NAD elderly very frail appearing    HEENT:  Dry oral mucosa, PERRL, EOMI	  Lymphatic: No lymphadenopathy  Cardiovascular: Irregular S1 S2, No JVD, No murmurs, No edema  Respiratory: decreased bs  Psychiatry: A & O x 3, Mood & affect appropriate  Gastrointestinal:  Soft, Non-tender, + BS	  Skin: No rashes, No ecchymoses, No cyanosis	  Neurologic: Non-focal  Extremities: Normal range of motion, No clubbing, cyanosis or edema  Vascular: Peripheral pulses palpable 2+ bilaterally    TELEMETRY: 	    ECG:  	Afib Inferior infarct  RADIOLOGY: < from: Xray Pelvis 3 Views (23 @ 17:15) >    ACC: [id number] EXAM:  XR PELVIS COMPLETE MIN 3 VIEWS   ORDERED BY:  ABIMAEL PENG     PROCEDURE DATE:  2023          INTERPRETATION:  CLINICAL INDICATION: Pubic rami fractures.    TECHNIQUE: 2 views of the pelvis.    COMPARISON: Compared to hip x-ray and pelvis CT from the same day.    FINDINGS: Bowel gas limiting evaluation of bony and soft tissue detail.   Internal fixation of the right proximal femur. Fractures of the left   superior and inferior pubic rami again noted. Difficult to assess left   sacral fracture on this study. Degenerative changes of the visualized   spine, pubic symphysis, sacroiliac and hip joints.    IMPRESSION:    Known left superior and inferior pubic rami fractures, better   characterized on concurrent pelvic CT.    --- End of Report ---      < end of copied text >  < from: Xray Femur 2 Views, Left (23 @ 16:33) >    ACC: [id number] EXAM:  XR HIP WITH PELV 2-3V LT   ORDERED BY: MARCE KENT     ACC: [id number] EXAM:  XR FEMUR 2 VIEWS LT   ORDERED BY: MARCE KENT     PROCEDURE DATE:  2023          INTERPRETATION:  Clinical indications: Left-sided hip pain status post   fall.    AP view the pelvis, 2 views of the left hip, and 2 views of the left   femur were performed.    FINDINGS:    There are acute left superior and inferior pubic rami fractures. There is   severe left hip arthrosis. Subarticular sclerosis along the superior   aspect of the left femoral head is suggestive of avascular necrosis.   There is no evidence of subarticular collapse. Patient is status post   open reduction internal fixation of the right hip. There is lower lumbar   spondylosis. There is moderate tricompartmental arthrosis of the knee.   Atherosclerotic disease.    IMPRESSION:    Acute left superior and inferior pubic rami fractures.    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   Severe left hip arthrosis. No evidence of subarticular collapse.    --- End of Report ---    < end of copied text >  < from: CT 3D Reconstruct w/o Workstation (23 @ 17:47) >    ACC: [id number] EXAM:  CT 3D RECONSTRUCT [facility]   ORDERED BY: MARCE KENT     ACC: [id number] EXAM:  CT PELVIS BONY ONLY   ORDERED BY: MARCE KENT     PROCEDURE DATE:  2023          INTERPRETATION:  CLINICAL INDICATION: L pubic ramifrxs on xr    TECHNIQUE: CT axial images of the pelvis were obtained without   intravenous contrast. Coronal and sagittal reformatted images were also   obtained. 3-D images were obtained from a separate workstation.    CONTRAST/COMPLICATIONS:  IV Contrast: NONE  Complications: None reported at time of study completion    COMPARISON: XR: 2023. CT: None. MR: None.    FINDINGS: Evaluation of soft tissue is limited without intravenous   contrast.    Bones: Decreased bone mineralization. Internal fixation of the right   femoral deformity via helical blade, short intramedullary leona and distal   interlocking screw. Question minimal lucency surrounding the femoral   intramedullary leona.    Acute, mildly displaced fracture of the left superior pubic ramus/pubic   tubercle and inferior pubic ramus. No dislocation.  Slight contour deformity, lucency and sclerosis of the left sacrum,   suggesting insufficiency fracture (possibly acute to subacute). Minimal   cortical irregularity of the right anterior sacrum.    Age indeterminate endplate depression at the L4 on L5. Grade 1   anterolisthesis of L4 on L5 and L5 on S1. Degenerative changes of the   visualized lower lumbar spine, pubic symphysis, sacroiliac and hip joints.    Soft tissue: Diffuse stranding at the visualized soft tissue. Asymmetric   enlargement of the left gluteus/obturator internus/thigh muscles with   surrounding stranding, suggesting edema/hematoma. Diffuse muscle bulk   loss with fatty replacement. No significant hip joint effusion.    Additional: Colon diverticulosis. Bilateral perinephric stranding. Small   free fluid. Atherosclerosis.    IMPRESSION:    Acute, mildly displaced fracture of the left superior pubic ramus/pubic   tubercle and inferior pubic ramus. Age-indeterminate left sacral fracture   (possibly acute to subacute). Minimal cortical irregularity of the right   anterior sacrum. Age-indeterminate endplate depression of the visualized   lumbar spine. If clinically indicated, follow-up MRI may be obtained for   further evaluation.    Question minimal periprosthetic lucency surrounding the right proximal   femur. Hardware loosening is not excluded. Recommend comparison to   previous outside study and follow-up as indicated.    Additional findingsas described.    --- End of Report ---           SHERIDAN ARNDT MD; Resident Radiologist  This document has been electronically signed.  KANDICE NICE MD; Attending Radiologist    < end of copied text >    OTHER: 	  	  LABS:	 	    CARDIAC MARKERS:                                  9.6    5.67  )-----------( 126      ( 17 Dec 2023 09:54 )             30.3     12-    140  |  105  |  29<H>  ----------------------------<  91  4.6   |  32<H>  |  0.54    Ca    8.6      17 Dec 2023 09:54  Phos  2.7     12-  Mg     1.9     -      proBNP:   Lipid Profile:   HgA1c:   TSH:

## 2023-12-17 NOTE — PROGRESS NOTE ADULT - SUBJECTIVE AND OBJECTIVE BOX
JEFFREY FREEDMAN  88y Female  MRN:[id number]    Patient is a 88y old  Female who presents with a chief complaint of s/p fall, now unable to stand amd ambulate / pubic rami fracture (14 Dec 2023 19:57)    HPI:  88-year-old female past medical history hypertension, hyperlipidemia, glaucoma, afib not on AC, presents to ED complaining of left hip and groin pain with inability to fully bear weight and difficulty ambulating status post unwitnessed mechanical trip and fall from standing yesterday afternoon.  Recalls event, fell while walking with a cane.  Landed on buttocks and then hit back of head on floor.  No LOC.  Was able to get under her own power.  Denies dizziness, vision changes, neck pain, chest pain, shortness of breath, abdominal pain, nausea, vomiting, urinary complaints. (14 Dec 2023 19:57)      Patient seen and evaluated at bedside. No acute events overnight except as noted.    Interval HPI: no acute events o/n     PAST MEDICAL & SURGICAL HISTORY:  HTN (Hypertension)      Hyperlipidemia      Glaucoma      Arrhythmia      Hip Fx      Polyp of Corpus Uteri      GERD (Gastroesophageal Reflux Disease)       Delivery x 2      Hip Fx-ORIF Right Hip-      Breast Cyst removal -       History of Tonsillectomy      cholecystectomy 2011          REVIEW OF SYSTEMS:  as per hpi     VITALS:   Vital Signs Last 24 Hrs  T(C): 36.3 (17 Dec 2023 11:46), Max: 36.6 (17 Dec 2023 00:09)  T(F): 97.3 (17 Dec 2023 11:46), Max: 97.9 (17 Dec 2023 00:09)  HR: 78 (17 Dec 2023 11:46) (78 - 102)  BP: 110/77 (17 Dec 2023 11:46) (96/63 - 118/83)  BP(mean): --  RR: 18 (17 Dec 2023 11:46) (18 - 18)  SpO2: 96% (17 Dec 2023 11:46) (96% - 97%)    Parameters below as of 17 Dec 2023 11:46  Patient On (Oxygen Delivery Method): nasal cannula  O2 Flow (L/min): 2        PHYSICAL EXAM:  GENERAL: NAD, well-developed  HEAD:  Atraumatic, Normocephalic  EYES: EOMI, PERRLA, conjunctiva and sclera clear  NECK: Supple, No JVD  CHEST/LUNG: Clear to auscultation bilaterally; No wheeze  HEART: S1, S2; No murmurs, rubs, or gallops  ABDOMEN: Soft, Nontender, Nondistended; Bowel sounds present  EXTREMITIES:  2+ Peripheral Pulses, No clubbing, cyanosis, or edema  PSYCH: Normal affect  NEUROLOGY: AAOX3; non-focal  SKIN: No rashes or lesions    Consultant(s) Notes Reviewed:  [x ] YES  [ ] NO  Care Discussed with Consultants/Other Providers [ x] YES  [ ] NO    MEDS:   MEDICATIONS  (STANDING):  atorvastatin 10 milliGRAM(s) Oral at bedtime  dorzolamide 2%/timolol 0.5% Ophthalmic Solution 1 Drop(s) Both EYES two times a day  doxazosin 4 milliGRAM(s) Oral at bedtime  heparin   Injectable 5000 Unit(s) SubCutaneous every 12 hours  latanoprost 0.005% Ophthalmic Solution 1 Drop(s) Both EYES at bedtime  metoprolol tartrate 50 milliGRAM(s) Oral three times a day  pantoprazole    Tablet 40 milliGRAM(s) Oral before breakfast  polyethylene glycol 3350 17 Gram(s) Oral daily    MEDICATIONS  (PRN):  acetaminophen     Tablet .. 650 milliGRAM(s) Oral every 6 hours PRN Temp greater or equal to 38C (100.4F), Moderate Pain (4 - 6)  oxyCODONE    IR 2.5 milliGRAM(s) Oral two times a day PRN Severe Pain (7 - 10)      ALLERGIES:  No Known Allergies      LABS:                                  9.6    5.67  )-----------( 126      ( 17 Dec 2023 09:54 )             30.3       140  |  105  |  29<H>  ----------------------------<  91  4.6   |  32<H>  |  0.54    Ca    8.6      17 Dec 2023 09:54  Phos  2.7       Mg     1.9             TSH: Thyroid Stimulating Hormone, Serum: 1.75 uIU/mL (12-15 @ 07:31)     < from: CT Pelvis Bony Only No Cont (23 @ 17:47) >    IMPRESSION:    Acute, mildly displaced fracture of the left superior pubic ramus/pubic   tubercle and inferior pubic ramus. Age-indeterminate left sacral fracture   (possibly acute to subacute). Minimal cortical irregularity of the right   anterior sacrum. Age-indeterminate endplate depression of the visualized   lumbar spine. If clinically indicated, follow-up MRI may be obtained for   further evaluation.    Question minimal periprosthetic lucency surrounding the right proximal   femur. Hardware loosening is not excluded. Recommend comparison to   previous outside study and follow-up as indicated.    Additional findingsas described.    --- End of Report ---      < end of copied text >   JEFFREY FREEDMAN  88y Female  MRN:[id number]    Patient is a 88y old  Female who presents with a chief complaint of s/p fall, now unable to stand amd ambulate / pubic rami fracture (14 Dec 2023 19:57)    HPI:  88-year-old female past medical history hypertension, hyperlipidemia, glaucoma, afib not on AC, presents to ED complaining of left hip and groin pain with inability to fully bear weight and difficulty ambulating status post unwitnessed mechanical trip and fall from standing yesterday afternoon.  Recalls event, fell while walking with a cane.  Landed on buttocks and then hit back of head on floor.  No LOC.  Was able to get under her own power.  Denies dizziness, vision changes, neck pain, chest pain, shortness of breath, abdominal pain, nausea, vomiting, urinary complaints. (14 Dec 2023 19:57)      Patient seen and evaluated at bedside. No acute events overnight except as noted.    Interval HPI: no acute events o/n     PAST MEDICAL & SURGICAL HISTORY:  HTN (Hypertension)      Hyperlipidemia      Glaucoma      Arrhythmia      Hip Fx      Polyp of Corpus Uteri      GERD (Gastroesophageal Reflux Disease)       Delivery x 2      Hip Fx-ORIF Right Hip-      Breast Cyst removal -       History of Tonsillectomy      cholecystectomy 2011          REVIEW OF SYSTEMS:  as per hpi     VITALS:   Vital Signs Last 24 Hrs  T(C): 36.3 (17 Dec 2023 11:46), Max: 36.6 (17 Dec 2023 00:09)  T(F): 97.3 (17 Dec 2023 11:46), Max: 97.9 (17 Dec 2023 00:09)  HR: 78 (17 Dec 2023 11:46) (78 - 102)  BP: 110/77 (17 Dec 2023 11:46) (96/63 - 118/83)  BP(mean): --  RR: 18 (17 Dec 2023 11:46) (18 - 18)  SpO2: 96% (17 Dec 2023 11:46) (96% - 97%)    Parameters below as of 17 Dec 2023 11:46  Patient On (Oxygen Delivery Method): nasal cannula  O2 Flow (L/min): 2        PHYSICAL EXAM:  GENERAL: NAD, well-developed  HEAD:  Atraumatic, Normocephalic  EYES: EOMI, PERRLA, conjunctiva and sclera clear  NECK: Supple, No JVD  CHEST/LUNG: Clear to auscultation bilaterally; No wheeze  HEART: S1, S2; No murmurs, rubs, or gallops  ABDOMEN: Soft, Nontender, Nondistended; Bowel sounds present  EXTREMITIES:  2+ Peripheral Pulses, No clubbing, cyanosis, or edema  PSYCH: Normal affect  NEUROLOGY: AAOX3; non-focal  SKIN: No rashes or lesions    Consultant(s) Notes Reviewed:  [x ] YES  [ ] NO  Care Discussed with Consultants/Other Providers [ x] YES  [ ] NO    MEDS:   MEDICATIONS  (STANDING):  atorvastatin 10 milliGRAM(s) Oral at bedtime  dorzolamide 2%/timolol 0.5% Ophthalmic Solution 1 Drop(s) Both EYES two times a day  doxazosin 4 milliGRAM(s) Oral at bedtime  heparin   Injectable 5000 Unit(s) SubCutaneous every 12 hours  latanoprost 0.005% Ophthalmic Solution 1 Drop(s) Both EYES at bedtime  metoprolol tartrate 50 milliGRAM(s) Oral three times a day  pantoprazole    Tablet 40 milliGRAM(s) Oral before breakfast  polyethylene glycol 3350 17 Gram(s) Oral daily    MEDICATIONS  (PRN):  acetaminophen     Tablet .. 650 milliGRAM(s) Oral every 6 hours PRN Temp greater or equal to 38C (100.4F), Moderate Pain (4 - 6)  oxyCODONE    IR 2.5 milliGRAM(s) Oral two times a day PRN Severe Pain (7 - 10)      ALLERGIES:  No Known Allergies      LABS:                                  9.6    5.67  )-----------( 126      ( 17 Dec 2023 09:54 )             30.3       140  |  105  |  29<H>  ----------------------------<  91  4.6   |  32<H>  |  0.54    Ca    8.6      17 Dec 2023 09:54  Phos  2.7       Mg     1.9             TSH: Thyroid Stimulating Hormone, Serum: 1.75 uIU/mL (12-15 @ 07:31)     < from: CT Pelvis Bony Only No Cont (23 @ 17:47) >    IMPRESSION:    Acute, mildly displaced fracture of the left superior pubic ramus/pubic   tubercle and inferior pubic ramus. Age-indeterminate left sacral fracture   (possibly acute to subacute). Minimal cortical irregularity of the right   anterior sacrum. Age-indeterminate endplate depression of the visualized   lumbar spine. If clinically indicated, follow-up MRI may be obtained for   further evaluation.    Question minimal periprosthetic lucency surrounding the right proximal   femur. Hardware loosening is not excluded. Recommend comparison to   previous outside study and follow-up as indicated.    Additional findingsas described.    --- End of Report ---      < end of copied text >

## 2023-12-18 LAB
SARS-COV-2 RNA SPEC QL NAA+PROBE: SIGNIFICANT CHANGE UP
SARS-COV-2 RNA SPEC QL NAA+PROBE: SIGNIFICANT CHANGE UP

## 2023-12-18 RX ADMIN — Medication 4 MILLIGRAM(S): at 21:31

## 2023-12-18 RX ADMIN — ATORVASTATIN CALCIUM 10 MILLIGRAM(S): 80 TABLET, FILM COATED ORAL at 21:31

## 2023-12-18 RX ADMIN — PANTOPRAZOLE SODIUM 40 MILLIGRAM(S): 20 TABLET, DELAYED RELEASE ORAL at 05:08

## 2023-12-18 RX ADMIN — Medication 50 MILLIGRAM(S): at 21:31

## 2023-12-18 RX ADMIN — Medication 50 MILLIGRAM(S): at 05:08

## 2023-12-18 RX ADMIN — DORZOLAMIDE HYDROCHLORIDE TIMOLOL MALEATE 1 DROP(S): 20; 5 SOLUTION/ DROPS OPHTHALMIC at 18:12

## 2023-12-18 RX ADMIN — DORZOLAMIDE HYDROCHLORIDE TIMOLOL MALEATE 1 DROP(S): 20; 5 SOLUTION/ DROPS OPHTHALMIC at 05:09

## 2023-12-18 RX ADMIN — LATANOPROST 1 DROP(S): 0.05 SOLUTION/ DROPS OPHTHALMIC; TOPICAL at 21:32

## 2023-12-18 RX ADMIN — Medication 50 MILLIGRAM(S): at 13:51

## 2023-12-18 RX ADMIN — HEPARIN SODIUM 5000 UNIT(S): 5000 INJECTION INTRAVENOUS; SUBCUTANEOUS at 18:11

## 2023-12-18 RX ADMIN — HEPARIN SODIUM 5000 UNIT(S): 5000 INJECTION INTRAVENOUS; SUBCUTANEOUS at 05:08

## 2023-12-18 NOTE — CONSULT NOTE ADULT - ASSESSMENT
Impression:    Sacral/bilateral Buttocks deep tissue damage present on admission  Lumbar spine stage 2 pressure injury present on admission  Incontinence of bowel and bladder  Incontinence dermatitis    Recommend:  1.) topical therapy: sacral/buttock injury - cleanse with incontinence cleanser, pat dry, apply Sae ointment BID and PRN for incontinent episodes  2.) Incontinence Management - incontinence cleanser, pads, pericare BID, continue external female urinary catheter  3.) Maintain on an alternating air with low air loss surface  4.) Turn and reposition Q 2 hours  5.) Nutrition optimization - please add Carlos  6.) Offload heels/feet with complete cair air fluidized boots; ensure that the soles of the feet are not resting on the foot board of the bed.    Care as per medicine. Will not actively follow but will remain available. Please recall for new issues or deterioration.  Upon [facility] [address] [place] [address] [phone]  Thank you for this consult  Karla Hunter, JELANI-C, CWOCN via TEAMS Impression:    Sacral/bilateral Buttocks deep tissue damage present on admission  Lumbar spine stage 2 pressure injury present on admission  Incontinence of bowel and bladder  Incontinence dermatitis    Recommend:  1.) topical therapy: sacral/buttock injury - cleanse with incontinence cleanser, pat dry, apply Sae ointment BID and PRN for incontinent episodes  2.) Incontinence Management - incontinence cleanser, pads, pericare BID, continue external female urinary catheter  3.) Maintain on an alternating air with low air loss surface  4.) Turn and reposition Q 2 hours  5.) Nutrition optimization - please add Carlos  6.) Offload heels/feet with complete cair air fluidized boots; ensure that the soles of the feet are not resting on the foot board of the bed.    Care as per medicine. Will not actively follow but will remain available. Please recall for new issues or deterioration.  Upon discharge f/u as outpatient at [facility] [address] [place] [address] [phone]  Thank you for this consult  Karla Hunter, JELANI-C, CWOCN via TEAMS

## 2023-12-18 NOTE — CONSULT NOTE ADULT - SUBJECTIVE AND OBJECTIVE BOX
Wound Surgery Consult Note:    HPI:  88-year-old female past medical history hypertension, hyperlipidemia, glaucoma, not on AC, presents to ED complaining of left hip and groin pain with inability to fully bear weight and difficulty ambulating status post unwitnessed mechanical trip and fall from standing yesterday afternoon.  Recalls event, fell while walking with a cane.  Landed on buttocks and then hit back of head on floor.  No LOC.  Was able to get under her own power.  Denies dizziness, vision changes, neck pain, chest pain, shortness of breath, abdominal pain, nausea, vomiting, urinary complaints. (14 Dec 2023 19:57)    Request for wound care consult for sacral/bilateral buttocks and lumbar spine skin injury received from nursing. Ms. Trivedi was encountered on an alternating air with low air loss surface.  She is functionally incontinent of stool and urine due to her inability to walk. The skin damage on her buttocks and lumbar spine may be related to her fall but pressure injuries cannot be ruled out due to her immobility. Her extreme immobility, inactivity, incontinence of stool and urine and poor nutritional status all contribute to her high risk for pressure injury development and hinder healing. Identification of the initial signs of deep tissue damage at the level of the skin within 72 hours of admission make this an injury that occurred prior to admission.    Pressure Injury prevention and management interventions including but not limited to turning and repositioning discussed with patient.    PAST MEDICAL & SURGICAL HISTORY:  HTN (Hypertension)  Hyperlipidemia  Glaucoma  Arrhythmia  Hip Fx  Polyp of Corpus Uteri  GERD (Gastroesophageal Reflux Disease)   Delivery x 2  Hip Fx-ORIF Right Hip-  Breast Cyst removal -   History of Tonsillectomy  cholecystectomy 2011    REVIEW OF SYSTEMS:  General: no weakness, no fever/chills, no weight loss/gain. + tired  Skin/Breast: no rash, no jaundice  Ophthalmologic: no vision changes, no dry eyes   Respiratory and Thorax: no cough, no wheezing, no hemoptysis, no dyspnea  Cardiovascular: no chest pain, no shortness of breath, no orthopnea  Gastrointestinal: no n/v/d, no abdominal pain, no dysphagia   Genitourinary: no dysuria, no frequency, no nocturia, no hematuria  Musculoskeletal: +fall, no sprain/strain, no myalgias, no arthralgias, + pelvic fracture  Neurological: no HA, no dizziness, + RLE weakness, no numbness  Psychiatric: no depression, no SI/HI  Hematology/Lymphatics: no easy bruising  Endocrine: no heat or cold intolerance. no weight gain or loss  Allergic/Immunologic: no allergy     MEDICATIONS  (STANDING):  atorvastatin 10 milliGRAM(s) Oral at bedtime  dorzolamide 2%/timolol 0.5% Ophthalmic Solution 1 Drop(s) Both EYES two times a day  doxazosin 4 milliGRAM(s) Oral at bedtime  heparin   Injectable 5000 Unit(s) SubCutaneous every 12 hours  latanoprost 0.005% Ophthalmic Solution 1 Drop(s) Both EYES at bedtime  metoprolol tartrate 50 milliGRAM(s) Oral three times a day  pantoprazole    Tablet 40 milliGRAM(s) Oral before breakfast  polyethylene glycol 3350 17 Gram(s) Oral daily    MEDICATIONS  (PRN):  acetaminophen     Tablet .. 650 milliGRAM(s) Oral every 6 hours PRN Temp greater or equal to 38C (100.4F), Moderate Pain (4 - 6)  oxyCODONE    IR 2.5 milliGRAM(s) Oral two times a day PRN Severe Pain (7 - 10)    Allergies    No Known Allergies    Intolerances    SOCIAL HISTORY:  , Denies smoking, ETOH, drugs    FAMILY HISTORY: no pertinent family history among first degree relatives    Vital Signs Last 24 Hrs  T(C): 36.3 (18 Dec 2023 08:10), Max: 36.6 (17 Dec 2023 20:16)  T(F): 97.3 (18 Dec 2023 08:10), Max: 97.9 (18 Dec 2023 04:12)  HR: 89 (18 Dec 2023 08:10) (89 - 107)  BP: 104/70 (18 Dec 2023 08:10) (103/72 - 110/74)  BP(mean): --  RR: 18 (18 Dec 2023 08:10) (18 - 18)  SpO2: 98% (18 Dec 2023 08:10) (98% - 100%)    Parameters below as of 18 Dec 2023 08:10  Patient On (Oxygen Delivery Method): nasal cannula  O2 Flow (L/min): 2    Physical Exam:  General: Alert, WN  Ophthamology: sclera clear  ENMT: moist mucous membranes, trachea midline  Respiratory: equal chest rise with respirations  Gastrointestinal: soft NT/ND  Neurology: verbal,  following commands  Psych: calm, appropriate  Musculoskeletal: no contractures  Vascular: BLE edema equal  Skin:  Sacral/bilateral buttocks with deep maroon discolored intact skin in and around the gluteal cleft  L 5cm X W 3cm x D none, no drainage  Lumbar spine with small superficially denuded area with no necrotic tissue, scant serosanguinous drainage L 1cm xW 1cm x D 0.1cm   No odor, erythema, increased warmth, tenderness, induration, fluctuance    LABS:      140  |  105  |  29<H>  ----------------------------<  91  4.6   |  32<H>  |  0.54    Ca    8.6      17 Dec 2023 09:54  Phos  2.7       Mg     1.9                                 9.6    5.67  )-----------( 126      ( 17 Dec 2023 09:54 )             30.3       Urinalysis Basic - ( 17 Dec 2023 09:54 )    Color: x / Appearance: x / SG: x / pH: x  Gluc: 91 mg/dL / Ketone: x  / Bili: x / Urobili: x   Blood: x / Protein: x / Nitrite: x   Leuk Esterase: x / RBC: x / WBC x   Sq Epi: x / Non Sq Epi: x / Bacteria: x

## 2023-12-18 NOTE — CONSULT NOTE ADULT - REASON FOR ADMISSION
s/p fall, now unable to stand amd ambulate / pubic rami fracture
s/p fall, now unable to stand amd ambulate / pubic rami fracture

## 2023-12-18 NOTE — PROGRESS NOTE ADULT - SUBJECTIVE AND OBJECTIVE BOX
JEFFREY FREEDMAN  88y Female  MRN:[id number]    Patient is a 88y old  Female who presents with a chief complaint of s/p fall, now unable to stand amd ambulate / pubic rami fracture (14 Dec 2023 19:57)    HPI:  88-year-old female past medical history hypertension, hyperlipidemia, glaucoma, afib not on AC, presents to ED complaining of left hip and groin pain with inability to fully bear weight and difficulty ambulating status post unwitnessed mechanical trip and fall from standing yesterday afternoon.  Recalls event, fell while walking with a cane.  Landed on buttocks and then hit back of head on floor.  No LOC.  Was able to get under her own power.  Denies dizziness, vision changes, neck pain, chest pain, shortness of breath, abdominal pain, nausea, vomiting, urinary complaints. (14 Dec 2023 19:57)      Patient seen and evaluated at bedside. No acute events overnight except as noted.    Interval HPI: no acute events o/n     PAST MEDICAL & SURGICAL HISTORY:  HTN (Hypertension)      Hyperlipidemia      Glaucoma      Arrhythmia      Hip Fx      Polyp of Corpus Uteri      GERD (Gastroesophageal Reflux Disease)       Delivery x 2      Hip Fx-ORIF Right Hip-      Breast Cyst removal -       History of Tonsillectomy      cholecystectomy 2011          REVIEW OF SYSTEMS:  as per hpi     VITALS:   Vital Signs Last 24 Hrs  T(C): 36.7 (18 Dec 2023 11:50), Max: 36.7 (18 Dec 2023 11:50)  T(F): 98 (18 Dec 2023 11:50), Max: 98 (18 Dec 2023 11:50)  HR: 98 (18 Dec 2023 11:50) (89 - 107)  BP: 115/80 (18 Dec 2023 11:50) (103/72 - 115/80)  BP(mean): --  RR: 18 (18 Dec 2023 11:50) (18 - 18)  SpO2: 97% (18 Dec 2023 11:50) (97% - 100%)    Parameters below as of 18 Dec 2023 11:50  Patient On (Oxygen Delivery Method): nasal cannula  O2 Flow (L/min): 2        PHYSICAL EXAM:  GENERAL: NAD, well-developed  HEAD:  Atraumatic, Normocephalic  EYES: EOMI, PERRLA, conjunctiva and sclera clear  NECK: Supple, No JVD  CHEST/LUNG: Clear to auscultation bilaterally; No wheeze  HEART: S1, S2; No murmurs, rubs, or gallops  ABDOMEN: Soft, Nontender, Nondistended; Bowel sounds present  EXTREMITIES:  2+ Peripheral Pulses, No clubbing, cyanosis, or edema  PSYCH: Normal affect  NEUROLOGY: AAOX3; non-focal  SKIN: No rashes or lesions    Consultant(s) Notes Reviewed:  [x ] YES  [ ] NO  Care Discussed with Consultants/Other Providers [ x] YES  [ ] NO    MEDS:   MEDICATIONS  (STANDING):  atorvastatin 10 milliGRAM(s) Oral at bedtime  dorzolamide 2%/timolol 0.5% Ophthalmic Solution 1 Drop(s) Both EYES two times a day  doxazosin 4 milliGRAM(s) Oral at bedtime  heparin   Injectable 5000 Unit(s) SubCutaneous every 12 hours  latanoprost 0.005% Ophthalmic Solution 1 Drop(s) Both EYES at bedtime  metoprolol tartrate 50 milliGRAM(s) Oral three times a day  pantoprazole    Tablet 40 milliGRAM(s) Oral before breakfast  polyethylene glycol 3350 17 Gram(s) Oral daily    MEDICATIONS  (PRN):  acetaminophen     Tablet .. 650 milliGRAM(s) Oral every 6 hours PRN Temp greater or equal to 38C (100.4F), Moderate Pain (4 - 6)  oxyCODONE    IR 2.5 milliGRAM(s) Oral two times a day PRN Severe Pain (7 - 10)      ALLERGIES:  No Known Allergies      LABS:                                       9.6    5.67  )-----------( 126      ( 17 Dec 2023 09:54 )             30.3       140  |  105  |  29<H>  ----------------------------<  91  4.6   |  32<H>  |  0.54    Ca    8.6      17 Dec 2023 09:54  Phos  2.7     -  Mg     1.9             TSH: Thyroid Stimulating Hormone, Serum: 1.75 uIU/mL (12-15 @ 07:31)     < from: CT Pelvis Bony Only No Cont (.14.23 @ 17:47) >    IMPRESSION:    Acute, mildly displaced fracture of the left superior pubic ramus/pubic   tubercle and inferior pubic ramus. Age-indeterminate left sacral fracture   (possibly acute to subacute). Minimal cortical irregularity of the right   anterior sacrum. Age-indeterminate endplate depression of the visualized   lumbar spine. If clinically indicated, follow-up MRI may be obtained for   further evaluation.    Question minimal periprosthetic lucency surrounding the right proximal   femur. Hardware loosening is not excluded. Recommend comparison to   previous outside study and follow-up as indicated.    Additional findingsas described.    --- End of Report ---      < end of copied text >   JEFFREY FREEDMAN  88y Female  MRN:[id number]    Patient is a 88y old  Female who presents with a chief complaint of s/p fall, now unable to stand amd ambulate / pubic rami fracture (14 Dec 2023 19:57)    HPI:  88-year-old female past medical history hypertension, hyperlipidemia, glaucoma, afib not on AC, presents to ED complaining of left hip and groin pain with inability to fully bear weight and difficulty ambulating status post unwitnessed mechanical trip and fall from standing yesterday afternoon.  Recalls event, fell while walking with a cane.  Landed on buttocks and then hit back of head on floor.  No LOC.  Was able to get under her own power.  Denies dizziness, vision changes, neck pain, chest pain, shortness of breath, abdominal pain, nausea, vomiting, urinary complaints. (14 Dec 2023 19:57)      Patient seen and evaluated at bedside. No acute events overnight except as noted.    Interval HPI: no acute events o/n     PAST MEDICAL & SURGICAL HISTORY:  HTN (Hypertension)      Hyperlipidemia      Glaucoma      Arrhythmia      Hip Fx      Polyp of Corpus Uteri      GERD (Gastroesophageal Reflux Disease)       Delivery x 2      Hip Fx-ORIF Right Hip-      Breast Cyst removal -       History of Tonsillectomy      cholecystectomy 2011          REVIEW OF SYSTEMS:  as per hpi     VITALS:   Vital Signs Last 24 Hrs  T(C): 36.7 (18 Dec 2023 11:50), Max: 36.7 (18 Dec 2023 11:50)  T(F): 98 (18 Dec 2023 11:50), Max: 98 (18 Dec 2023 11:50)  HR: 98 (18 Dec 2023 11:50) (89 - 107)  BP: 115/80 (18 Dec 2023 11:50) (103/72 - 115/80)  BP(mean): --  RR: 18 (18 Dec 2023 11:50) (18 - 18)  SpO2: 97% (18 Dec 2023 11:50) (97% - 100%)    Parameters below as of 18 Dec 2023 11:50  Patient On (Oxygen Delivery Method): nasal cannula  O2 Flow (L/min): 2        PHYSICAL EXAM:  GENERAL: NAD, well-developed  HEAD:  Atraumatic, Normocephalic  EYES: EOMI, PERRLA, conjunctiva and sclera clear  NECK: Supple, No JVD  CHEST/LUNG: Clear to auscultation bilaterally; No wheeze  HEART: S1, S2; No murmurs, rubs, or gallops  ABDOMEN: Soft, Nontender, Nondistended; Bowel sounds present  EXTREMITIES:  2+ Peripheral Pulses, No clubbing, cyanosis, or edema  PSYCH: Normal affect  NEUROLOGY: AAOX3; non-focal  SKIN: No rashes or lesions    Consultant(s) Notes Reviewed:  [x ] YES  [ ] NO  Care Discussed with Consultants/Other Providers [ x] YES  [ ] NO    MEDS:   MEDICATIONS  (STANDING):  atorvastatin 10 milliGRAM(s) Oral at bedtime  dorzolamide 2%/timolol 0.5% Ophthalmic Solution 1 Drop(s) Both EYES two times a day  doxazosin 4 milliGRAM(s) Oral at bedtime  heparin   Injectable 5000 Unit(s) SubCutaneous every 12 hours  latanoprost 0.005% Ophthalmic Solution 1 Drop(s) Both EYES at bedtime  metoprolol tartrate 50 milliGRAM(s) Oral three times a day  pantoprazole    Tablet 40 milliGRAM(s) Oral before breakfast  polyethylene glycol 3350 17 Gram(s) Oral daily    MEDICATIONS  (PRN):  acetaminophen     Tablet .. 650 milliGRAM(s) Oral every 6 hours PRN Temp greater or equal to 38C (100.4F), Moderate Pain (4 - 6)  oxyCODONE    IR 2.5 milliGRAM(s) Oral two times a day PRN Severe Pain (7 - 10)      ALLERGIES:  No Known Allergies      LABS:                                       9.6    5.67  )-----------( 126      ( 17 Dec 2023 09:54 )             30.3       140  |  105  |  29<H>  ----------------------------<  91  4.6   |  32<H>  |  0.54    Ca    8.6      17 Dec 2023 09:54  Phos  2.7     -  Mg     1.9             TSH: Thyroid Stimulating Hormone, Serum: 1.75 uIU/mL (12-15 @ 07:31)     < from: CT Pelvis Bony Only No Cont (.14.23 @ 17:47) >    IMPRESSION:    Acute, mildly displaced fracture of the left superior pubic ramus/pubic   tubercle and inferior pubic ramus. Age-indeterminate left sacral fracture   (possibly acute to subacute). Minimal cortical irregularity of the right   anterior sacrum. Age-indeterminate endplate depression of the visualized   lumbar spine. If clinically indicated, follow-up MRI may be obtained for   further evaluation.    Question minimal periprosthetic lucency surrounding the right proximal   femur. Hardware loosening is not excluded. Recommend comparison to   previous outside study and follow-up as indicated.    Additional findingsas described.    --- End of Report ---      < end of copied text >

## 2023-12-18 NOTE — PROGRESS NOTE ADULT - SUBJECTIVE AND OBJECTIVE BOX
Subjective: Patient seen and examined. No new events except as noted.     REVIEW OF SYSTEMS:    CONSTITUTIONAL: No weakness, fevers or chills  EYES/ENT: No visual changes;  No vertigo or throat pain   NECK: No pain or stiffness  RESPIRATORY: No cough, wheezing, hemoptysis; No shortness of breath  CARDIOVASCULAR: No chest pain or palpitations  GASTROINTESTINAL: No abdominal or epigastric pain. No nausea, vomiting, or hematemesis; No diarrhea or constipation. No melena or hematochezia.  GENITOURINARY: No dysuria, frequency or hematuria  NEUROLOGICAL: No numbness or weakness  SKIN: No itching, burning, rashes, or lesions   All other review of systems is negative unless indicated above.    MEDICATIONS:  MEDICATIONS  (STANDING):  atorvastatin 10 milliGRAM(s) Oral at bedtime  dorzolamide 2%/timolol 0.5% Ophthalmic Solution 1 Drop(s) Both EYES two times a day  doxazosin 4 milliGRAM(s) Oral at bedtime  heparin   Injectable 5000 Unit(s) SubCutaneous every 12 hours  latanoprost 0.005% Ophthalmic Solution 1 Drop(s) Both EYES at bedtime  metoprolol tartrate 50 milliGRAM(s) Oral three times a day  pantoprazole    Tablet 40 milliGRAM(s) Oral before breakfast  polyethylene glycol 3350 17 Gram(s) Oral daily      PHYSICAL EXAM:  T(C): 36.7 (12-18-23 @ 11:50), Max: 36.7 (12-18-23 @ 11:50)  HR: 98 (12-18-23 @ 11:50) (89 - 107)  BP: 115/80 (12-18-23 @ 11:50) (103/72 - 115/80)  RR: 18 (12-18-23 @ 11:50) (18 - 18)  SpO2: 97% (12-18-23 @ 11:50) (97% - 99%)  Wt(kg): --  I&O's Summary    17 Dec 2023 07:01  -  18 Dec 2023 07:00  --------------------------------------------------------  IN: 840 mL / OUT: 0 mL / NET: 840 mL    18 Dec 2023 07:01  -  18 Dec 2023 19:02  --------------------------------------------------------  IN: 440 mL / OUT: 400 mL / NET: 40 mL          Appearance: NAD elderly very frail appearing    HEENT:  Dry oral mucosa, PERRL, EOMI	  Lymphatic: No lymphadenopathy  Cardiovascular: Irregular S1 S2, No JVD, No murmurs, No edema  Respiratory: decreased bs  Psychiatry: A & O x 3, Mood & affect appropriate  Gastrointestinal:  Soft, Non-tender, + BS	  Skin: No rashes, No ecchymoses, No cyanosis	  Neurologic: Non-focal  Extremities: Normal range of motion, No clubbing, cyanosis or edema  Vascular: Peripheral pulses palpable 2+ bilaterally      LABS:    CARDIAC MARKERS:                                9.6    5.67  )-----------( 126      ( 17 Dec 2023 09:54 )             30.3     12-17    140  |  105  |  29<H>  ----------------------------<  91  4.6   |  32<H>  |  0.54    Ca    8.6      17 Dec 2023 09:54  Phos  2.7     12-17  Mg     1.9     12-17      proBNP:   Lipid Profile:   HgA1c:   TSH:             TELEMETRY: 	    ECG:  	  RADIOLOGY:   DIAGNOSTIC TESTING:  [ ] Echocardiogram:  [ ]  Catheterization:  [ ] Stress Test:    OTHER:

## 2023-12-19 ENCOUNTER — TRANSCRIPTION ENCOUNTER (OUTPATIENT)
Age: 88
End: 2023-12-19

## 2023-12-19 VITALS
TEMPERATURE: 98 F | DIASTOLIC BLOOD PRESSURE: 71 MMHG | RESPIRATION RATE: 18 BRPM | OXYGEN SATURATION: 97 % | HEART RATE: 87 BPM | SYSTOLIC BLOOD PRESSURE: 110 MMHG

## 2023-12-19 PROCEDURE — 70450 CT HEAD/BRAIN W/O DYE: CPT | Mod: MA

## 2023-12-19 PROCEDURE — 76376 3D RENDER W/INTRP POSTPROCES: CPT

## 2023-12-19 PROCEDURE — 85025 COMPLETE CBC W/AUTO DIFF WBC: CPT

## 2023-12-19 PROCEDURE — 86901 BLOOD TYPING SEROLOGIC RH(D): CPT

## 2023-12-19 PROCEDURE — 83735 ASSAY OF MAGNESIUM: CPT

## 2023-12-19 PROCEDURE — 72190 X-RAY EXAM OF PELVIS: CPT

## 2023-12-19 PROCEDURE — 93005 ELECTROCARDIOGRAM TRACING: CPT

## 2023-12-19 PROCEDURE — 84100 ASSAY OF PHOSPHORUS: CPT

## 2023-12-19 PROCEDURE — 85027 COMPLETE CBC AUTOMATED: CPT

## 2023-12-19 PROCEDURE — 73552 X-RAY EXAM OF FEMUR 2/>: CPT

## 2023-12-19 PROCEDURE — 80048 BASIC METABOLIC PNL TOTAL CA: CPT

## 2023-12-19 PROCEDURE — 84443 ASSAY THYROID STIM HORMONE: CPT

## 2023-12-19 PROCEDURE — 86922 COMPATIBILITY TEST ANTIGLOB: CPT

## 2023-12-19 PROCEDURE — 36415 COLL VENOUS BLD VENIPUNCTURE: CPT

## 2023-12-19 PROCEDURE — 85730 THROMBOPLASTIN TIME PARTIAL: CPT

## 2023-12-19 PROCEDURE — 85610 PROTHROMBIN TIME: CPT

## 2023-12-19 PROCEDURE — 86900 BLOOD TYPING SEROLOGIC ABO: CPT

## 2023-12-19 PROCEDURE — 73502 X-RAY EXAM HIP UNI 2-3 VIEWS: CPT

## 2023-12-19 PROCEDURE — 97116 GAIT TRAINING THERAPY: CPT

## 2023-12-19 PROCEDURE — 86850 RBC ANTIBODY SCREEN: CPT

## 2023-12-19 PROCEDURE — 80053 COMPREHEN METABOLIC PANEL: CPT

## 2023-12-19 PROCEDURE — 97110 THERAPEUTIC EXERCISES: CPT

## 2023-12-19 PROCEDURE — 97161 PT EVAL LOW COMPLEX 20 MIN: CPT

## 2023-12-19 PROCEDURE — 72192 CT PELVIS W/O DYE: CPT | Mod: MA

## 2023-12-19 PROCEDURE — 87635 SARS-COV-2 COVID-19 AMP PRB: CPT

## 2023-12-19 PROCEDURE — 99285 EMERGENCY DEPT VISIT HI MDM: CPT

## 2023-12-19 RX ORDER — TRAVOPROST 0.04 MG/ML
1 SOLUTION/ DROPS OPHTHALMIC
Refills: 0 | DISCHARGE

## 2023-12-19 RX ORDER — TERAZOSIN HYDROCHLORIDE 10 MG/1
1 CAPSULE ORAL
Refills: 0 | DISCHARGE

## 2023-12-19 RX ORDER — DOXAZOSIN MESYLATE 4 MG
1 TABLET ORAL
Qty: 0 | Refills: 0 | DISCHARGE
Start: 2023-12-19

## 2023-12-19 RX ORDER — ACETAMINOPHEN 500 MG
2 TABLET ORAL
Qty: 0 | Refills: 0 | DISCHARGE
Start: 2023-12-19

## 2023-12-19 RX ORDER — DORZOLAMIDE HYDROCHLORIDE TIMOLOL MALEATE 20; 5 MG/ML; MG/ML
1 SOLUTION/ DROPS OPHTHALMIC
Refills: 0 | DISCHARGE

## 2023-12-19 RX ORDER — METOPROLOL TARTRATE 50 MG
1 TABLET ORAL
Refills: 0 | DISCHARGE

## 2023-12-19 RX ORDER — TOBRAMYCIN AND DEXAMETHASONE 1; 3 MG/ML; MG/ML
1 SUSPENSION/ DROPS OPHTHALMIC
Refills: 0 | DISCHARGE

## 2023-12-19 RX ORDER — DORZOLAMIDE HYDROCHLORIDE TIMOLOL MALEATE 20; 5 MG/ML; MG/ML
1 SOLUTION/ DROPS OPHTHALMIC
Qty: 0 | Refills: 0 | DISCHARGE
Start: 2023-12-19

## 2023-12-19 RX ORDER — OXYCODONE HYDROCHLORIDE 5 MG/1
2.5 TABLET ORAL
Qty: 0 | Refills: 0 | DISCHARGE
Start: 2023-12-19

## 2023-12-19 RX ORDER — LATANOPROST 0.05 MG/ML
1 SOLUTION/ DROPS OPHTHALMIC; TOPICAL
Qty: 0 | Refills: 0 | DISCHARGE
Start: 2023-12-19

## 2023-12-19 RX ORDER — METOPROLOL TARTRATE 50 MG
1 TABLET ORAL
Qty: 0 | Refills: 0 | DISCHARGE
Start: 2023-12-19

## 2023-12-19 RX ORDER — POLYETHYLENE GLYCOL 3350 17 G/17G
17 POWDER, FOR SOLUTION ORAL
Qty: 0 | Refills: 0 | DISCHARGE
Start: 2023-12-19

## 2023-12-19 RX ADMIN — HEPARIN SODIUM 5000 UNIT(S): 5000 INJECTION INTRAVENOUS; SUBCUTANEOUS at 05:16

## 2023-12-19 RX ADMIN — Medication 50 MILLIGRAM(S): at 05:16

## 2023-12-19 RX ADMIN — Medication 50 MILLIGRAM(S): at 13:08

## 2023-12-19 RX ADMIN — DORZOLAMIDE HYDROCHLORIDE TIMOLOL MALEATE 1 DROP(S): 20; 5 SOLUTION/ DROPS OPHTHALMIC at 17:09

## 2023-12-19 RX ADMIN — DORZOLAMIDE HYDROCHLORIDE TIMOLOL MALEATE 1 DROP(S): 20; 5 SOLUTION/ DROPS OPHTHALMIC at 05:17

## 2023-12-19 RX ADMIN — PANTOPRAZOLE SODIUM 40 MILLIGRAM(S): 20 TABLET, DELAYED RELEASE ORAL at 05:16

## 2023-12-19 RX ADMIN — HEPARIN SODIUM 5000 UNIT(S): 5000 INJECTION INTRAVENOUS; SUBCUTANEOUS at 17:09

## 2023-12-19 NOTE — PROGRESS NOTE ADULT - REASON FOR ADMISSION
s/p fall, now unable to stand amd ambulate / pubic rami fracture

## 2023-12-19 NOTE — DISCHARGE NOTE PROVIDER - CARE PROVIDER_API CALL
Homer Agosto  Orthopaedic Trauma  [address] [place]  [place] [address]  Phone: [phone]  Fax: [phone]  Follow Up Time: 1 week    Anders Salguero  [facility]  [address] [place] [address]  Phone: [phone]  Fax: [phone]  Follow Up Time: 1 week    Mohinder Michael  [address], [address]  [place] [address]  Phone: [phone]  Fax: [phone]  Follow Up Time:    Homer Agosto  Orthopaedic Trauma  [address], [address]  [place] [address]  Phone: [phone]  Fax: [phone]  Follow Up Time: 1 week    Anders Salguero  [facility]  [address] [place] [address]  Phone: [phone]  Fax: [phone]  Follow Up Time: 1 week    Mohinder Michael  [address], [address]  [place] [address]  Phone: [phone]  Fax: [phone]  Follow Up Time:

## 2023-12-19 NOTE — DISCHARGE NOTE PROVIDER - HOSPITAL COURSE
HPI:  88-year-old female past medical history hypertension, hyperlipidemia, glaucoma, not on AC, presents to ED complaining of left hip and groin pain with inability to fully bear weight and difficulty ambulating status post unwitnessed mechanical trip and fall from standing yesterday afternoon.  Recalls event, fell while walking with a cane.  Landed on buttocks and then hit back of head on floor.  No LOC.  Was able to get under her own power.  Denies dizziness, vision changes, neck pain, chest pain, shortness of breath, abdominal pain, nausea, vomiting, urinary complaints. (14 Dec 2023 19:57)    Hospital Course:  Patient admitted for a fall. Imaging revealed a left superior/inferior pubic rami fracture. Ortho was consulted; no acute surgical intervention needed; continue conservative management and pain control. PT evaluated the patient and recommended subacute rehab  Cardiology consulted for patient's history HTN an atrial fibrillation; continue all medications as prescribed. Patient off anticoagulation given history of falls.    Important Medication Changes and Reason:  >Continue pain control as prescribed     Active or Pending Issues Requiring Follow-up:  >PCP follow up within one week for further outpatient management   >Ortho followup with Dr. Agosto within one week for further outpatient management   >Cardiology follow up for further management of hypertension and atrial fibrillation     Advanced Directives:   [X] Full code  [ ] DNR  [ ] Hospice    Discharge Diagnoses:  >L pubic rami fracture     Discharge/dispo/med rec discussed with medical attending Dr. Bautista. Patient is medically cleared for discharge with outpatient follow up

## 2023-12-19 NOTE — DISCHARGE NOTE NURSING/CASE MANAGEMENT/SOCIAL WORK - NSDCPEFALRISK_GEN_ALL_CORE
For information on Fall & Injury Prevention, visit: [email or web address] OR  [email or web address] OR  https://www.cdc.gov/steadi/patient.html For information on Fall & Injury Prevention, visit: [email or web address] OR  [email or web address] OR  https://www.cdc.gov/steadi/patient.html

## 2023-12-19 NOTE — PROGRESS NOTE ADULT - PROBLEM SELECTOR PLAN 2
seen by ortho in ED   conservative Rx   pain meds   PT eval   DVT ppx.
seen by ortho in ED   conservative Rx   pain meds   PT eval   DVT ppx.

## 2023-12-19 NOTE — PROGRESS NOTE ADULT - ASSESSMENT
87 yo female h/o htn, chol, afib off AC, here s/p mechanical fall with pubic rami fx 89 yo female h/o htn, chol, afib off AC, here s/p mechanical fall with pubic rami fx

## 2023-12-19 NOTE — PROGRESS NOTE ADULT - SUBJECTIVE AND OBJECTIVE BOX
JEFFREY FREEDMAN  88y Female  MRN:[id number]    Patient is a 88y old  Female who presents with a chief complaint of s/p fall, now unable to stand amd ambulate / pubic rami fracture (14 Dec 2023 19:57)    HPI:  88-year-old female past medical history hypertension, hyperlipidemia, glaucoma, afib not on AC, presents to ED complaining of left hip and groin pain with inability to fully bear weight and difficulty ambulating status post unwitnessed mechanical trip and fall from standing yesterday afternoon.  Recalls event, fell while walking with a cane.  Landed on buttocks and then hit back of head on floor.  No LOC.  Was able to get under her own power.  Denies dizziness, vision changes, neck pain, chest pain, shortness of breath, abdominal pain, nausea, vomiting, urinary complaints. (14 Dec 2023 19:57)      Patient seen and evaluated at bedside. No acute events overnight except as noted.    Interval HPI: no acute events o/n     PAST MEDICAL & SURGICAL HISTORY:  HTN (Hypertension)      Hyperlipidemia      Glaucoma      Arrhythmia      Hip Fx      Polyp of Corpus Uteri      GERD (Gastroesophageal Reflux Disease)       Delivery x 2      Hip Fx-ORIF Right Hip-      Breast Cyst removal -       History of Tonsillectomy      cholecystectomy 2011          REVIEW OF SYSTEMS:  as per hpi     VITALS:   Vital Signs Last 24 Hrs  T(C): 36.7 (19 Dec 2023 12:15), Max: 36.7 (19 Dec 2023 08:20)  T(F): 98 (19 Dec 2023 12:15), Max: 98 (19 Dec 2023 08:20)  HR: 95 (19 Dec 2023 12:15) (81 - 108)  BP: 107/62 (19 Dec 2023 12:15) (107/62 - 124/76)  BP(mean): --  RR: 18 (19 Dec 2023 12:15) (18 - 18)  SpO2: 93% (19 Dec 2023 12:15) (93% - 98%)    Parameters below as of 19 Dec 2023 12:15  Patient On (Oxygen Delivery Method): nasal cannula  O2 Flow (L/min): 3        PHYSICAL EXAM:  GENERAL: NAD, well-developed  HEAD:  Atraumatic, Normocephalic  EYES: EOMI, PERRLA, conjunctiva and sclera clear  NECK: Supple, No JVD  CHEST/LUNG: Clear to auscultation bilaterally; No wheeze  HEART: S1, S2; No murmurs, rubs, or gallops  ABDOMEN: Soft, Nontender, Nondistended; Bowel sounds present  EXTREMITIES:  2+ Peripheral Pulses, No clubbing, cyanosis, or edema  PSYCH: Normal affect  NEUROLOGY: AAOX3; non-focal  SKIN: No rashes or lesions    Consultant(s) Notes Reviewed:  [x ] YES  [ ] NO  Care Discussed with Consultants/Other Providers [ x] YES  [ ] NO    MEDS:   MEDICATIONS  (STANDING):  atorvastatin 10 milliGRAM(s) Oral at bedtime  dorzolamide 2%/timolol 0.5% Ophthalmic Solution 1 Drop(s) Both EYES two times a day  doxazosin 4 milliGRAM(s) Oral at bedtime  heparin   Injectable 5000 Unit(s) SubCutaneous every 12 hours  latanoprost 0.005% Ophthalmic Solution 1 Drop(s) Both EYES at bedtime  metoprolol tartrate 50 milliGRAM(s) Oral three times a day  pantoprazole    Tablet 40 milliGRAM(s) Oral before breakfast  polyethylene glycol 3350 17 Gram(s) Oral daily    MEDICATIONS  (PRN):  acetaminophen     Tablet .. 650 milliGRAM(s) Oral every 6 hours PRN Temp greater or equal to 38C (100.4F), Moderate Pain (4 - 6)  oxyCODONE    IR 2.5 milliGRAM(s) Oral two times a day PRN Severe Pain (7 - 10)      ALLERGIES:  No Known Allergies      LABS:                  TSH: Thyroid Stimulating Hormone, Serum: 1.75 uIU/mL (12-15 @ 07:31)     < from: CT Pelvis Bony Only No Cont (23 @ 17:47) >    IMPRESSION:    Acute, mildly displaced fracture of the left superior pubic ramus/pubic   tubercle and inferior pubic ramus. Age-indeterminate left sacral fracture   (possibly acute to subacute). Minimal cortical irregularity of the right   anterior sacrum. Age-indeterminate endplate depression of the visualized   lumbar spine. If clinically indicated, follow-up MRI may be obtained for   further evaluation.    Question minimal periprosthetic lucency surrounding the right proximal   femur. Hardware loosening is not excluded. Recommend comparison to   previous outside study and follow-up as indicated.    Additional findingsas described.    --- End of Report ---      < end of copied text >   JEFFREY FREEDMAN  88y Female  MRN:[id number]    Patient is a 88y old  Female who presents with a chief complaint of s/p fall, now unable to stand amd ambulate / pubic rami fracture (14 Dec 2023 19:57)    HPI:  88-year-old female past medical history hypertension, hyperlipidemia, glaucoma, afib not on AC, presents to ED complaining of left hip and groin pain with inability to fully bear weight and difficulty ambulating status post unwitnessed mechanical trip and fall from standing yesterday afternoon.  Recalls event, fell while walking with a cane.  Landed on buttocks and then hit back of head on floor.  No LOC.  Was able to get under her own power.  Denies dizziness, vision changes, neck pain, chest pain, shortness of breath, abdominal pain, nausea, vomiting, urinary complaints. (14 Dec 2023 19:57)      Patient seen and evaluated at bedside. No acute events overnight except as noted.    Interval HPI: no acute events o/n     PAST MEDICAL & SURGICAL HISTORY:  HTN (Hypertension)      Hyperlipidemia      Glaucoma      Arrhythmia      Hip Fx      Polyp of Corpus Uteri      GERD (Gastroesophageal Reflux Disease)       Delivery x 2      Hip Fx-ORIF Right Hip-      Breast Cyst removal -       History of Tonsillectomy      cholecystectomy 2011          REVIEW OF SYSTEMS:  as per hpi     VITALS:   Vital Signs Last 24 Hrs  T(C): 36.7 (19 Dec 2023 12:15), Max: 36.7 (19 Dec 2023 08:20)  T(F): 98 (19 Dec 2023 12:15), Max: 98 (19 Dec 2023 08:20)  HR: 95 (19 Dec 2023 12:15) (81 - 108)  BP: 107/62 (19 Dec 2023 12:15) (107/62 - 124/76)  BP(mean): --  RR: 18 (19 Dec 2023 12:15) (18 - 18)  SpO2: 93% (19 Dec 2023 12:15) (93% - 98%)    Parameters below as of 19 Dec 2023 12:15  Patient On (Oxygen Delivery Method): nasal cannula  O2 Flow (L/min): 3        PHYSICAL EXAM:  GENERAL: NAD, well-developed  HEAD:  Atraumatic, Normocephalic  EYES: EOMI, PERRLA, conjunctiva and sclera clear  NECK: Supple, No JVD  CHEST/LUNG: Clear to auscultation bilaterally; No wheeze  HEART: S1, S2; No murmurs, rubs, or gallops  ABDOMEN: Soft, Nontender, Nondistended; Bowel sounds present  EXTREMITIES:  2+ Peripheral Pulses, No clubbing, cyanosis, or edema  PSYCH: Normal affect  NEUROLOGY: AAOX3; non-focal  SKIN: No rashes or lesions    Consultant(s) Notes Reviewed:  [x ] YES  [ ] NO  Care Discussed with Consultants/Other Providers [ x] YES  [ ] NO    MEDS:   MEDICATIONS  (STANDING):  atorvastatin 10 milliGRAM(s) Oral at bedtime  dorzolamide 2%/timolol 0.5% Ophthalmic Solution 1 Drop(s) Both EYES two times a day  doxazosin 4 milliGRAM(s) Oral at bedtime  heparin   Injectable 5000 Unit(s) SubCutaneous every 12 hours  latanoprost 0.005% Ophthalmic Solution 1 Drop(s) Both EYES at bedtime  metoprolol tartrate 50 milliGRAM(s) Oral three times a day  pantoprazole    Tablet 40 milliGRAM(s) Oral before breakfast  polyethylene glycol 3350 17 Gram(s) Oral daily    MEDICATIONS  (PRN):  acetaminophen     Tablet .. 650 milliGRAM(s) Oral every 6 hours PRN Temp greater or equal to 38C (100.4F), Moderate Pain (4 - 6)  oxyCODONE    IR 2.5 milliGRAM(s) Oral two times a day PRN Severe Pain (7 - 10)      ALLERGIES:  No Known Allergies      LABS:                  TSH: Thyroid Stimulating Hormone, Serum: 1.75 uIU/mL (12-15 @ 07:31)     < from: CT Pelvis Bony Only No Cont (23 @ 17:47) >    IMPRESSION:    Acute, mildly displaced fracture of the left superior pubic ramus/pubic   tubercle and inferior pubic ramus. Age-indeterminate left sacral fracture   (possibly acute to subacute). Minimal cortical irregularity of the right   anterior sacrum. Age-indeterminate endplate depression of the visualized   lumbar spine. If clinically indicated, follow-up MRI may be obtained for   further evaluation.    Question minimal periprosthetic lucency surrounding the right proximal   femur. Hardware loosening is not excluded. Recommend comparison to   previous outside study and follow-up as indicated.    Additional findingsas described.    --- End of Report ---      < end of copied text >

## 2023-12-19 NOTE — DISCHARGE NOTE PROVIDER - PROVIDER TOKENS
PROVIDER:[TOKEN:[[id number]],FOLLOWUP:[1 week]],PROVIDER:[TOKEN:[[id number]],FOLLOWUP:[1 week]],PROVIDER:[TOKEN:[[id number] PROVIDER:[TOKEN:[[id number]],FOLLOWUP:[1 week]],PROVIDER:[TOKEN:[[id number]],FOLLOWUP:[1 week]],PROVIDER:[TOKEN:[[id number]]]

## 2023-12-19 NOTE — PROGRESS NOTE ADULT - SUBJECTIVE AND OBJECTIVE BOX
Subjective: Patient seen and examined. No new events except as noted.     REVIEW OF SYSTEMS:    CONSTITUTIONAL: + weakness, fevers or chills  EYES/ENT: No visual changes;  No vertigo or throat pain   NECK: No pain or stiffness  RESPIRATORY: No cough, wheezing, hemoptysis; No shortness of breath  CARDIOVASCULAR: No chest pain or palpitations  GASTROINTESTINAL: No abdominal or epigastric pain. No nausea, vomiting, or hematemesis; No diarrhea or constipation. No melena or hematochezia.  GENITOURINARY: No dysuria, frequency or hematuria  NEUROLOGICAL: No numbness or weakness  SKIN: No itching, burning, rashes, or lesions   All other review of systems is negative unless indicated above.    MEDICATIONS:  MEDICATIONS  (STANDING):  atorvastatin 10 milliGRAM(s) Oral at bedtime  dorzolamide 2%/timolol 0.5% Ophthalmic Solution 1 Drop(s) Both EYES two times a day  doxazosin 4 milliGRAM(s) Oral at bedtime  heparin   Injectable 5000 Unit(s) SubCutaneous every 12 hours  latanoprost 0.005% Ophthalmic Solution 1 Drop(s) Both EYES at bedtime  metoprolol tartrate 50 milliGRAM(s) Oral three times a day  pantoprazole    Tablet 40 milliGRAM(s) Oral before breakfast  polyethylene glycol 3350 17 Gram(s) Oral daily      PHYSICAL EXAM:  T(C): 36.7 (12-19-23 @ 08:20), Max: 36.7 (12-18-23 @ 11:50)  HR: 81 (12-19-23 @ 08:20) (81 - 108)  BP: 110/75 (12-19-23 @ 08:20) (109/77 - 124/76)  RR: 18 (12-19-23 @ 08:20) (18 - 18)  SpO2: 97% (12-19-23 @ 08:20) (93% - 98%)  Wt(kg): --  I&O's Summary    18 Dec 2023 07:01  -  19 Dec 2023 07:00  --------------------------------------------------------  IN: 740 mL / OUT: 400 mL / NET: 340 mL    19 Dec 2023 07:01  -  19 Dec 2023 09:59  --------------------------------------------------------  IN: 240 mL / OUT: 250 mL / NET: -10 mL            Appearance: NAD elderly very frail appearing    HEENT:  Dry oral mucosa, PERRL, EOMI	  Lymphatic: No lymphadenopathy  Cardiovascular: Irregular S1 S2, No JVD, No murmurs, No edema  Respiratory: decreased bs  Psychiatry: A & O x 3, Mood & affect appropriate  Gastrointestinal:  Soft, Non-tender, + BS	  Skin: No rashes, No ecchymoses, No cyanosis	  Neurologic: Non-focal  Extremities: Normal range of motion, No clubbing, cyanosis or edema  Vascular: Peripheral pulses palpable 2+ bilaterally      LABS:    CARDIAC MARKERS:                  proBNP:   Lipid Profile:   HgA1c:   TSH:             TELEMETRY: 	    ECG:  	  RADIOLOGY:   DIAGNOSTIC TESTING:  [ ] Echocardiogram:  [ ]  Catheterization:  [ ] Stress Test:    OTHER:

## 2023-12-19 NOTE — DISCHARGE NOTE PROVIDER - NSDCCPCAREPLAN_GEN_ALL_CORE_FT
PRINCIPAL DISCHARGE DIAGNOSIS  Diagnosis: Fracture of multiple pubic rami  Assessment and Plan of Treatment: You presented after a fall and were found to have a pubic rami fracture Ortho was consulted; no acute ortho intervention needed   Continue pain control  Follow up with ortho within one week for further outpatient mangement      SECONDARY DISCHARGE DIAGNOSES  Diagnosis: Other persistent atrial fibrillation  Assessment and Plan of Treatment: Atrial fibrillation is a common heart rhythm problem which increases the risk of stroke and heat attack.  It helps if you control your blood pressure, avoid alcohol, cut down on caffeine, get treatment for your thyroid if it is overactive, and perform moderate exercise in consultation with your Primary Care Provider.  Call your doctor if you experience chest tightness/pain, lightheadedness, loss of consciousness, shortness of breath (especially with exercise), feel your heart racing or beating unusually, frequent or abnormal bleeding.  It is important to take all your heart medications as prescribed.    Diagnosis: HTN (hypertension)  Assessment and Plan of Treatment: Continue to follow a low salt/sodium diet.  Perform physical activities as tolerated in consultation with your Primary Care Provider and physical therapist.  Take all medications as prescribed.  Follow up with your medical doctor for routine blood pressure monitoring at your next visit.  Notify your doctor if you have any of the following symptoms:  Dizziness, lightheadedness, blurry vision, headache, chest pain, or shortness of breath.

## 2023-12-19 NOTE — DISCHARGE NOTE NURSING/CASE MANAGEMENT/SOCIAL WORK - PATIENT PORTAL LINK FT
You can access the FollowMyHealth Patient Portal offered by [facility] by registering at the following website: [email or web address]. By joining [employer]’s FollowMyHealth portal, you will also be able to view your health information using other applications (apps) compatible with our system. You can access the FollowMyHealth Patient Portal offered by [facility] by registering at the following website: [email or web address]. By joining [employer]’s FollowMyHealth portal, you will also be able to view your health information using other applications (apps) compatible with our system.

## 2023-12-19 NOTE — PROGRESS NOTE ADULT - PROBLEM SELECTOR PLAN 1
Lopressor to 50 tid   No AC due to high fall risk.
normal...
Lopressor to 50 tid   No AC due to high fall risk.

## 2023-12-19 NOTE — PROGRESS NOTE ADULT - ASSESSMENT
87 yo female h/o htn, chol, afib off AC, here s/p mechanical fall with pubic rami fx     left sup/ inf pubic rami fracture   -seen by ortho in ED   conservative Rx   pain meds   PT      HTN :   c/w toprol     afib  with rvr  cards consult f/u  off AC 2/2 falls  rate control with bb as ordered       Glaucoma :  c/w eye drops       dc planning rehab        Advanced care planning was discussed with patient and family.  Advanced care planning forms were reviewed and discussed as appropriate.  Differential diagnosis and plan of care discussed with patient after the evaluation.   Pain assessed and judicious use of narcotics when appropriate was discussed.  Importance of Fall prevention discussed.  Counseling on Smoking and Alcohol cessation was offered when appropriate.  Counseling on Diet, exercise, and medication compliance was done.       Approx 75 minutes spent. 89 yo female h/o htn, chol, afib off AC, here s/p mechanical fall with pubic rami fx     left sup/ inf pubic rami fracture   -seen by ortho in ED   conservative Rx   pain meds   PT      HTN :   c/w toprol     afib  with rvr  cards consult f/u  off AC 2/2 falls  rate control with bb as ordered       Glaucoma :  c/w eye drops       dc planning rehab        Advanced care planning was discussed with patient and family.  Advanced care planning forms were reviewed and discussed as appropriate.  Differential diagnosis and plan of care discussed with patient after the evaluation.   Pain assessed and judicious use of narcotics when appropriate was discussed.  Importance of Fall prevention discussed.  Counseling on Smoking and Alcohol cessation was offered when appropriate.  Counseling on Diet, exercise, and medication compliance was done.       Approx 75 minutes spent.

## 2023-12-19 NOTE — DISCHARGE NOTE PROVIDER - NSDCMRMEDTOKEN_GEN_ALL_CORE_FT
acetaminophen 325 mg oral tablet: 2 tab(s) orally every 6 hours As needed Temp greater or equal to 38C (100.4F), Moderate Pain (4 - 6)  atorvastatin 10 mg oral tablet: 1 tab(s) orally once a day  dorzolamide-timolol 2.23%-0.68% (2%-0.5% base) ophthalmic solution: 1 drop(s) in the right eye 2 times a day  esomeprazole 40 mg oral delayed release capsule: 1 cap(s) orally once a day  metoprolol tartrate 50 mg oral tablet: 1 tab(s) orally 2 times a day  terazosin 5 mg oral capsule: 1 cap(s) orally once a day  tobramycin-dexamethasone 0.3%-0.1% ophthalmic suspension: 1 drop(s) in the left eye 2 times a day  travoprost 0.004% ophthalmic solution: 1 drop(s) in the right eye once a day (at bedtime)   acetaminophen 325 mg oral tablet: 2 tab(s) orally every 6 hours As needed Temp greater or equal to 38C (100.4F), Moderate Pain (4 - 6)  atorvastatin 10 mg oral tablet: 1 tab(s) orally once a day  dorzolamide-timolol 2.23%-0.68% (2%-0.5% base) preservative-free ophthalmic solution: 1 drop(s) to each affected eye 2 times a day  doxazosin 4 mg oral tablet: 1 tab(s) orally once a day (at bedtime)  esomeprazole 40 mg oral delayed release capsule: 1 cap(s) orally once a day  latanoprost 0.005% ophthalmic solution: 1 drop(s) to each affected eye once a day (at bedtime)  metoprolol tartrate 50 mg oral tablet: 1 tab(s) orally 3 times a day  oxyCODONE: 2.5 milligram(s) orally 2 times a day as needed for  severe pain  polyethylene glycol 3350 oral powder for reconstitution: 17 gram(s) orally once a day

## 2023-12-19 NOTE — PROGRESS NOTE ADULT - PROVIDER SPECIALTY LIST ADULT
Cardiology
Internal Medicine
Cardiology

## 2023-12-26 ENCOUNTER — APPOINTMENT (OUTPATIENT)
Dept: ORTHOPEDIC SURGERY | Facility: CLINIC | Age: 88
End: 2023-12-26
Payer: MEDICARE

## 2023-12-26 VITALS — BODY MASS INDEX: 17.07 KG/M2 | WEIGHT: 100 LBS | HEIGHT: 64 IN

## 2023-12-26 DIAGNOSIS — S32.82XA MULTIPLE FRACTURES OF PELVIS W/OUT DISRUPTION OF PELVIC RING, INITIAL ENCOUNTER FOR CLOSED FRACTURE: ICD-10-CM

## 2023-12-26 PROCEDURE — 72190 X-RAY EXAM OF PELVIS: CPT

## 2023-12-26 PROCEDURE — 27197 CLSD TX PELVIC RING FX: CPT

## 2023-12-26 PROCEDURE — 99203 OFFICE O/P NEW LOW 30 MIN: CPT | Mod: 25

## 2023-12-26 RX ORDER — OXYCODONE HYDROCHLORIDE 5 MG/5ML
5 SOLUTION ORAL
Refills: 0 | Status: ACTIVE | COMMUNITY

## 2024-01-03 PROBLEM — S32.82XA MULTIPLE CLOSED FRACTURES OF PELVIS WITHOUT DISRUPTION OF PELVIC RING, INITIAL ENCOUNTER: Status: ACTIVE | Noted: 2023-12-26

## 2024-01-03 NOTE — PHYSICAL EXAM
[de-identified] : Left lower extremity - Skin intact - Minimal pain with range of motion of the left hip - Able to flex and extend knee dorsiflex and plantarflex ankle and toes - Sensation intact light touch distally - Leg warm well-perfused [de-identified] : Pelvis CT scan 7/14/2023 [facility]: My independent interpretation another physician study: There is a zone 1 fracture of the left sacrum and a inferior and superior rami fracture consistent with a LC 1 pelvic injury

## 2024-01-03 NOTE — HISTORY OF PRESENT ILLNESS
[de-identified] : 88-year-old female who had a unwitnessed mechanical fall on December 14, 2023.  She was brought to the emergency department at [facility].  She had inability to fully weight-bear and difficulty ambulating.  She underwent x-rays and CT scans found to have a LC 1 pelvic injury.  Today patient reports her pain has improved.  She recently currently in a rehab facility.  They are working on physical therapy with her.

## 2024-01-03 NOTE — ASSESSMENT
[FreeTextEntry1] : 88-year-old female with a left minimally displaced zone 1 sacral fracture inferior and superior rami fractures consistent with a LC 1 pelvic injury - Patient currently in rehab she is progressing physical therapy.  Recommend continuing gait training and strengthening. - Patient may be weight-bear as tolerated to left lower extremity.  Range of motion as tolerated as well. - The x-ray findings CT findings were discussed with the patient and her family.  Discussed that the fractures are stable and repeat x-rays done today 3 views of the pelvis with AP inlet and outlet demonstrate a stable pelvic ring.  Discussed that the fracture should heal.  Recommend closed management of anterior and posterior pelvic ring fractures without manipulation.  After discussion with the patient and the patient's family they elected proceed with closed management. - Patient to follow-up in 6 weeks for repeat exam and imaging.

## 2024-01-04 ENCOUNTER — EMERGENCY (EMERGENCY)
Facility: HOSPITAL | Age: 89
LOS: 1 days | Discharge: NURSING, REHAB OR LONG-TERM CARE | End: 2024-01-04
Attending: EMERGENCY MEDICINE
Payer: MEDICARE

## 2024-01-04 VITALS
RESPIRATION RATE: 20 BRPM | SYSTOLIC BLOOD PRESSURE: 116 MMHG | HEART RATE: 88 BPM | WEIGHT: 115.74 LBS | HEIGHT: 63 IN | OXYGEN SATURATION: 95 % | DIASTOLIC BLOOD PRESSURE: 77 MMHG

## 2024-01-04 LAB
ANION GAP SERPL CALC-SCNC: 4 MMOL/L — LOW (ref 5–17)
ANION GAP SERPL CALC-SCNC: 4 MMOL/L — LOW (ref 5–17)
APTT BLD: 27.9 SEC — SIGNIFICANT CHANGE UP (ref 24.5–35.6)
APTT BLD: 27.9 SEC — SIGNIFICANT CHANGE UP (ref 24.5–35.6)
BASOPHILS # BLD AUTO: 0.01 K/UL — SIGNIFICANT CHANGE UP (ref 0–0.2)
BASOPHILS # BLD AUTO: 0.01 K/UL — SIGNIFICANT CHANGE UP (ref 0–0.2)
BASOPHILS NFR BLD AUTO: 0.2 % — SIGNIFICANT CHANGE UP (ref 0–2)
BASOPHILS NFR BLD AUTO: 0.2 % — SIGNIFICANT CHANGE UP (ref 0–2)
BUN SERPL-MCNC: 35 MG/DL — HIGH (ref 7–23)
BUN SERPL-MCNC: 35 MG/DL — HIGH (ref 7–23)
CALCIUM SERPL-MCNC: 8.8 MG/DL — SIGNIFICANT CHANGE UP (ref 8.4–10.5)
CALCIUM SERPL-MCNC: 8.8 MG/DL — SIGNIFICANT CHANGE UP (ref 8.4–10.5)
CHLORIDE SERPL-SCNC: 96 MMOL/L — SIGNIFICANT CHANGE UP (ref 96–108)
CHLORIDE SERPL-SCNC: 96 MMOL/L — SIGNIFICANT CHANGE UP (ref 96–108)
CO2 SERPL-SCNC: 35 MMOL/L — HIGH (ref 22–31)
CO2 SERPL-SCNC: 35 MMOL/L — HIGH (ref 22–31)
CREAT SERPL-MCNC: 0.48 MG/DL — LOW (ref 0.5–1.3)
CREAT SERPL-MCNC: 0.48 MG/DL — LOW (ref 0.5–1.3)
EGFR: 91 ML/MIN/1.73M2 — SIGNIFICANT CHANGE UP
EGFR: 91 ML/MIN/1.73M2 — SIGNIFICANT CHANGE UP
EOSINOPHIL # BLD AUTO: 0.07 K/UL — SIGNIFICANT CHANGE UP (ref 0–0.5)
EOSINOPHIL # BLD AUTO: 0.07 K/UL — SIGNIFICANT CHANGE UP (ref 0–0.5)
EOSINOPHIL NFR BLD AUTO: 1.2 % — SIGNIFICANT CHANGE UP (ref 0–6)
EOSINOPHIL NFR BLD AUTO: 1.2 % — SIGNIFICANT CHANGE UP (ref 0–6)
GLUCOSE SERPL-MCNC: 88 MG/DL — SIGNIFICANT CHANGE UP (ref 70–99)
GLUCOSE SERPL-MCNC: 88 MG/DL — SIGNIFICANT CHANGE UP (ref 70–99)
HCT VFR BLD CALC: 31.3 % — LOW (ref 34.5–45)
HCT VFR BLD CALC: 31.3 % — LOW (ref 34.5–45)
HGB BLD-MCNC: 9.7 G/DL — LOW (ref 11.5–15.5)
HGB BLD-MCNC: 9.7 G/DL — LOW (ref 11.5–15.5)
IMM GRANULOCYTES NFR BLD AUTO: 1 % — HIGH (ref 0–0.9)
IMM GRANULOCYTES NFR BLD AUTO: 1 % — HIGH (ref 0–0.9)
INR BLD: 1.26 RATIO — HIGH (ref 0.85–1.18)
INR BLD: 1.26 RATIO — HIGH (ref 0.85–1.18)
LYMPHOCYTES # BLD AUTO: 0.74 K/UL — LOW (ref 1–3.3)
LYMPHOCYTES # BLD AUTO: 0.74 K/UL — LOW (ref 1–3.3)
LYMPHOCYTES # BLD AUTO: 12.3 % — LOW (ref 13–44)
LYMPHOCYTES # BLD AUTO: 12.3 % — LOW (ref 13–44)
MAGNESIUM SERPL-MCNC: 1.2 MG/DL — LOW (ref 1.6–2.6)
MAGNESIUM SERPL-MCNC: 1.2 MG/DL — LOW (ref 1.6–2.6)
MCHC RBC-ENTMCNC: 30.3 PG — SIGNIFICANT CHANGE UP (ref 27–34)
MCHC RBC-ENTMCNC: 30.3 PG — SIGNIFICANT CHANGE UP (ref 27–34)
MCHC RBC-ENTMCNC: 31 GM/DL — LOW (ref 32–36)
MCHC RBC-ENTMCNC: 31 GM/DL — LOW (ref 32–36)
MCV RBC AUTO: 97.8 FL — SIGNIFICANT CHANGE UP (ref 80–100)
MCV RBC AUTO: 97.8 FL — SIGNIFICANT CHANGE UP (ref 80–100)
MONOCYTES # BLD AUTO: 0.51 K/UL — SIGNIFICANT CHANGE UP (ref 0–0.9)
MONOCYTES # BLD AUTO: 0.51 K/UL — SIGNIFICANT CHANGE UP (ref 0–0.9)
MONOCYTES NFR BLD AUTO: 8.4 % — SIGNIFICANT CHANGE UP (ref 2–14)
MONOCYTES NFR BLD AUTO: 8.4 % — SIGNIFICANT CHANGE UP (ref 2–14)
NEUTROPHILS # BLD AUTO: 4.65 K/UL — SIGNIFICANT CHANGE UP (ref 1.8–7.4)
NEUTROPHILS # BLD AUTO: 4.65 K/UL — SIGNIFICANT CHANGE UP (ref 1.8–7.4)
NEUTROPHILS NFR BLD AUTO: 76.9 % — SIGNIFICANT CHANGE UP (ref 43–77)
NEUTROPHILS NFR BLD AUTO: 76.9 % — SIGNIFICANT CHANGE UP (ref 43–77)
NRBC # BLD: 0 /100 WBCS — SIGNIFICANT CHANGE UP (ref 0–0)
NRBC # BLD: 0 /100 WBCS — SIGNIFICANT CHANGE UP (ref 0–0)
NT-PROBNP SERPL-SCNC: 2618 PG/ML — HIGH (ref 0–300)
NT-PROBNP SERPL-SCNC: 2618 PG/ML — HIGH (ref 0–300)
PLATELET # BLD AUTO: 164 K/UL — SIGNIFICANT CHANGE UP (ref 150–400)
PLATELET # BLD AUTO: 164 K/UL — SIGNIFICANT CHANGE UP (ref 150–400)
POTASSIUM SERPL-MCNC: 5.3 MMOL/L — SIGNIFICANT CHANGE UP (ref 3.5–5.3)
POTASSIUM SERPL-MCNC: 5.3 MMOL/L — SIGNIFICANT CHANGE UP (ref 3.5–5.3)
POTASSIUM SERPL-SCNC: 5.3 MMOL/L — SIGNIFICANT CHANGE UP (ref 3.5–5.3)
POTASSIUM SERPL-SCNC: 5.3 MMOL/L — SIGNIFICANT CHANGE UP (ref 3.5–5.3)
PROTHROM AB SERPL-ACNC: 13.1 SEC — HIGH (ref 9.5–13)
PROTHROM AB SERPL-ACNC: 13.1 SEC — HIGH (ref 9.5–13)
RBC # BLD: 3.2 M/UL — LOW (ref 3.8–5.2)
RBC # BLD: 3.2 M/UL — LOW (ref 3.8–5.2)
RBC # FLD: 15.1 % — HIGH (ref 10.3–14.5)
RBC # FLD: 15.1 % — HIGH (ref 10.3–14.5)
SODIUM SERPL-SCNC: 135 MMOL/L — SIGNIFICANT CHANGE UP (ref 135–145)
SODIUM SERPL-SCNC: 135 MMOL/L — SIGNIFICANT CHANGE UP (ref 135–145)
TROPONIN T, HIGH SENSITIVITY RESULT: 51 NG/L — SIGNIFICANT CHANGE UP (ref 0–51)
TROPONIN T, HIGH SENSITIVITY RESULT: 51 NG/L — SIGNIFICANT CHANGE UP (ref 0–51)
WBC # BLD: 6.04 K/UL — SIGNIFICANT CHANGE UP (ref 3.8–10.5)
WBC # BLD: 6.04 K/UL — SIGNIFICANT CHANGE UP (ref 3.8–10.5)
WBC # FLD AUTO: 6.04 K/UL — SIGNIFICANT CHANGE UP (ref 3.8–10.5)
WBC # FLD AUTO: 6.04 K/UL — SIGNIFICANT CHANGE UP (ref 3.8–10.5)

## 2024-01-04 PROCEDURE — 99285 EMERGENCY DEPT VISIT HI MDM: CPT

## 2024-01-04 RX ORDER — MAGNESIUM SULFATE 500 MG/ML
2 VIAL (ML) INJECTION ONCE
Refills: 0 | Status: COMPLETED | OUTPATIENT
Start: 2024-01-04 | End: 2024-01-04

## 2024-01-04 RX ORDER — MAGNESIUM OXIDE 400 MG ORAL TABLET 241.3 MG
400 TABLET ORAL ONCE
Refills: 0 | Status: COMPLETED | OUTPATIENT
Start: 2024-01-04 | End: 2024-01-04

## 2024-01-04 RX ADMIN — Medication 25 GRAM(S): at 23:57

## 2024-01-04 RX ADMIN — MAGNESIUM OXIDE 400 MG ORAL TABLET 400 MILLIGRAM(S): 241.3 TABLET ORAL at 23:57

## 2024-01-04 NOTE — ED PROVIDER NOTE - PROGRESS NOTE DETAILS
Attending Rick Phillips:  Patient signed out to me, here from [facility] with above knee DVT, ref to ED to r/o PE. Cardiac biomarkers with mild elevation 51 -> 53. Will rpt 3rd. Likely in setting of elevated BNP. CT head indepdent interp by me w/o acute bleed. CTA chest independent interp by me with signs of saddle PE, there is some pulm edema and effusion in right lung with layering, compressive atelectasis. Pending radiology read. Will await AC decision (doac, Hep gtt) depending on final results, and this will dictate if patient will return to [facility] or be admitted. Attending Rick Phillips:  Patient signed out to me, here from [facility] with above knee DVT, ref to ED to r/o PE. Cardiac biomarkers with mild elevation 51 -> 53. Will rpt 3rd. Likely in setting of elevated BNP. CT head indepdent interp by me w/o acute bleed. CTA chest independent interp by me with signs of saddle PE, there is some pulm edema and effusion in right lung with layering, compressive atelectasis. Pending radiology read. Will await AC decision (doac, Hep gtt) depending on final results, and this will dictate if patient will return to [facility] or be admitted. VELMA Sterling PGY-2: mil[facility] resident Discussed due to downtime of CT results.  There is no acute PE.  There is mild to moderate pleural effusion with atelectasis.   Will send back to [facility] with apixaban rx instructions for charles to fill. Attending Rick Phillips:  CT reading loculated pleural effusion with compressive atelectasis, elevated right heart pressures are suggestive, no PE. Given no PE, hemodynam stable, no inc o2 req, patient is stable for doac. CTH w/o acute bleed. Attending Rick Phillips:  CT reading loculated pleural effusion with compressive atelectasis, elevated right heart pressures are suggestive, no PE. Given no PE, hemodynam stable, no inc o2 req, patient is stable for doac. Patient, of note, is currently being tx with lasix at [facility]. [facility] w/o acute bleed. Will discuss with [facility] re: obtaining outpt TTE, plan communicated, findings communicated, will dc. Attending Rick Phillips:  CT reading loculated pleural effusion with compressive atelectasis, elevated right heart pressures are suggestive, no PE. Given no PE, hemodynam stable, no inc o2 req, patient is stable for doac. Patient, of note, is currently being tx with lasix at [facility]. [facility] w/o acute bleed. Will discuss with [facility] re: obtaining outpt TTE, plan communicated, findings communicated, will dc.

## 2024-01-04 NOTE — ED PROVIDER NOTE - PHYSICAL EXAMINATION
Exam as stated below:   CONSTITUTIONAL: In NAD.   SKIN: Warm dry.  bruising noted to the posterior left calf.   EYES: No scleral icterus. Conjunctiva pink.  ENT: Posterior pharynx with no erythema.   NECK: No ttp.    CARD: RRR. No murmurs.  RESP: no WOB inc. On minimal o2, nbot plugged into wall. Satting WNL. Clear to ausculation b/l. No Crackles noted. No Wheezing noted.  ABD: Soft. Non-tender. Not distended.   MSK:   Bilateral swelling noted, DP intact bilateral.   NEURO: UE/LE grossly intact. Motor UE/LE sensation grossly intact. CN II-XII grossly intact.   PSYCH: Cooperative, appropriate.

## 2024-01-04 NOTE — ED ADULT TRIAGE NOTE - IDEAL BODY WEIGHT(KG)
Reason For Visit  BRENDA CASTAÑEDA is here today for a nurse visit for TB reading.      Allergies  No Known Drug Allergies    Assessment   1. 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      Signatures   Electronically signed by : CHAY PHIPPS NP; Jul 13 2017  6:03PM CST     52

## 2024-01-04 NOTE — ED ADULT NURSE NOTE - OBJECTIVE STATEMENT
PT is an 88 year old A&OX4 female with PMH of left hip fracture with recent hospital admission, HTN, HLD, arrhythmia, and atrial fibrillation not on AC due to falls who presents to the ED via EMS from [facility] with c/o B/L leg swelling. PT on 2L nasal cannula at baseline. PT denies SOB, chest pain, N/V/D, dizziness, and fevers. Per EMS, [facility] was concerned for leg swelling and sent to rule-out PE as PT has a known DVT. PT is resting comfortably in bed, breathing unlabored on 2L nasal cannula, and speaking in complete sentences. Abdomen is soft, non-tender, and non-distended. Skin is warm and dry, no diaphoresis noted. Edema noted to B/L lower extremities. PT able to move all extremities spontaneously, sensation intact. IV access established 18G in left AC. PT placed in hospital gown. EKG completed, PT placed on cardiac monitor. Safety and comfort maintained. Family at the bedside. PT is an 88 year old A&OX4 female with PMH of left hip fracture with recent hospital admission, HTN, HLD, arrhythmia, and atrial fibrillation not on AC due to falls who presents to the ED via EMS from [facility] with c/o B/L leg swelling. PT on 2L nasal cannula at baseline. PT denies SOB, chest pain, N/V/D, dizziness, and fevers. Per EMS, [facility] was concerned for leg swelling and sent to rule-out PE as PT has a known DVT. PT is resting comfortably in bed, breathing unlabored on 2L nasal cannula, and speaking in complete sentences. Abdomen is soft, non-tender, and non-distended. Skin is warm and dry, no diaphoresis noted. Edema noted to B/L lower extremities. PT able to move all extremities spontaneously, sensation intact. IV access established 18G in left AC. PT placed in hospital gown. EKG completed, PT placed on cardiac monitor. Safety and comfort maintained. Family at the bedside.

## 2024-01-04 NOTE — ED PROVIDER NOTE - OBJECTIVE STATEMENT
HPI & ROS: 88-year-old female with a history of a hip fracture recently admitted here, hypertension, hyperlipidemia, arrhythmia, A-fib not on AC due to falls on previous discharge summary notation, coming in from [facility] for swelling of the legs.  patient is on oxygen at baseline.  Not feeling more short of breath  or with chest pain recently.  Patient completing PT as normal.  charles was concern for swelling of the legs, noted to not be on AC and sent here for CT PE i/s/o above knee DVT found @ [facility]. HPI & ROS: 88-year-old female with a history of a hip fracture recently admitted here, hypertension, hyperlipidemia, arrhythmia, A-fib not on AC due to falls on previous discharge summary notation, coming in from [facility] for swelling of the legs.  patient is on oxygen at baseline.  Not feeling more short of breath  or with chest pain recently.  Patient completing PT as normal.  charles was concern for swelling of the legs, noted to not be on AC and sent here for CT PE i/s/o above knee DVT found @ [facility].

## 2024-01-04 NOTE — ED PROVIDER NOTE - PATIENT PORTAL LINK FT
You can access the FollowMyHealth Patient Portal offered by [facility] by registering at the following website: [email or web address]. By joining [employer]’s FollowMyHealth portal, you will also be able to view your health information using other applications (apps) compatible with our system. You can access the FollowMyHealth Patient Portal offered by [facility] by registering at the following website: [email or web address]. By joining [employer]’s FollowMyHealth portal, you will also be able to view your health information using other applications (apps) compatible with our system.

## 2024-01-04 NOTE — ED ADULT NURSE NOTE - NSFALLHARMRISKINTERV_ED_ALL_ED
Assistance OOB with selected safe patient handling equipment if applicable/Assistance with ambulation/Communicate risk of Fall with Harm to all staff, patient, and family/Monitor gait and stability/Provide visual cue: red socks, yellow wristband, yellow gown, etc/Reinforce activity limits and safety measures with patient and family/Bed in lowest position, wheels locked, appropriate side rails in place/Call bell, personal items and telephone in reach/Instruct patient to call for assistance before getting out of bed/chair/stretcher/Non-slip footwear applied when patient is off stretcher/[place] to call system/Physically safe environment - no spills, clutter or unnecessary equipment/Purposeful Proactive Rounding/Room/bathroom lighting operational, light cord in reach Assistance OOB with selected safe patient handling equipment if applicable/Assistance with ambulation/Communicate risk of Fall with Harm to all staff, patient, and family/Monitor gait and stability/Provide visual cue: red socks, yellow wristband, yellow gown, etc/Reinforce activity limits and safety measures with patient and family/Bed in lowest position, wheels locked, appropriate side rails in place/Call bell, personal items and telephone in reach/Instruct patient to call for assistance before getting out of bed/chair/stretcher/Non-slip footwear applied when patient is off stretcher/[place] to call system/Physically safe environment - no spills, clutter or unnecessary equipment/Purposeful Proactive Rounding/Room/bathroom lighting operational, light cord in reach

## 2024-01-04 NOTE — ED PROVIDER NOTE - NSFOLLOWUPINSTRUCTIONS_ED_ALL_ED_FT
- The patient does have her DVT.  The patient has a mild to moderate pleural effusion.  The patient does not have a pulmonary embolism.  The treatment for DVT would be apixaban, we will give her first dose here.  I recommend 10 twice daily for 7 days, then 5 twice daily.  Weigh this between the need for AC and her frequent falls.    - Your testing/exams was/were reassuring that dangerous emergencies/conditions are less likely to be occurring or to have occurred.    - Take all medications, if given/sent to pharmacy, and as, directed.    - If you had labs or imaging done, you were given copies of all labs and/or imaging results from your er visit--please take them with you to your follow up appointments.  - If needed, call patient access services at [phone] to find a primary care physician (PCP). Call this number to follow up with a specialty service, such as the spine clinic. If you need this, call and say you were recently in the emergency department and you are calling, per my orders.   - Make sure you do not require a primary care physician's referral if you make a specialty clinic appointment directly. Some insurance requires you to see your PCP, get a referral, then make a specialty appointment. - The patient does have her DVT.  The patient has a mild to moderate pleural effusion.  The patient does not have a pulmonary embolism.  The treatment for DVT would be apixaban, we will give her first dose here.  I recommend 10 twice daily for 7 days, then 5 twice daily.  Weigh this between the need for AC and her frequent falls.    - Your testing/exams was/were reassuring that dangerous emergencies/conditions are less likely to be occurring or to have occurred.    - Take all medications, if given/sent to pharmacy, and as, directed.    - If you had labs or imaging done, you were given copies of all labs and/or imaging results from your er visit--please take them with you to your follow up appointments.  - If needed, call patient access services at [phone] to find a primary care physician (PCP). Call this number to follow up with a specialty service, such as the spine clinic. If you need this, call and say you were recently in the emergency department and you are calling, per my orders.   - Make sure you do not require a primary care physician's referral if you make a specialty clinic appointment directly. Some insurance requires you to see your PCP, get a referral, then make a specialty appointment.

## 2024-01-04 NOTE — ED ADULT TRIAGE NOTE - NSWEIGHTCALCTOOLDRUG_GEN_A_CORE
I have personally seen and examined the patient.  I reviewed and agree with physician assistant / nurse practitioner’s assessment and plan of care, with the following exceptions: None  My examination, assessment, and plan of care of Randi Saul is as follows:     Blood pressure.  She has been told to increase her losartan but has not done it yet.  Denies any symptoms.  Is concerned because of the elevated numbers.    Well-developed female no obvious distress.  Neck is supple full range of motion.  No respiratory distress.  Awake alert and oriented.    Patient's pressure did come down.  Was asymptomatic.  Will discharge with increase losartan.     Torres Flores MD  06/13/19 [id number]      used

## 2024-01-04 NOTE — ED PROVIDER NOTE - CLINICAL SUMMARY MEDICAL DECISION MAKING FREE TEXT BOX
Fay: 87yo who presents with increased LE edema, found to have DVT today be US. Patient with hx of lung dz on 2 Lts of O2 at baseline. not more SOB now. Patient with LE pitting edema. Patient with hx of afib but not on anticoagulation because of falls. Will evaluate hemodynamics. get labs and hct. and decide on anticoagulation. Lab studies ordered, independently reviewed and acted on as appropriate.

## 2024-01-05 VITALS
OXYGEN SATURATION: 98 % | HEART RATE: 94 BPM | SYSTOLIC BLOOD PRESSURE: 108 MMHG | RESPIRATION RATE: 17 BRPM | DIASTOLIC BLOOD PRESSURE: 69 MMHG | TEMPERATURE: 98 F

## 2024-01-05 LAB
CK MB CFR SERPL CALC: 5.5 NG/ML — HIGH (ref 0–3.8)
CK MB CFR SERPL CALC: 5.5 NG/ML — HIGH (ref 0–3.8)
CK SERPL-CCNC: 40 U/L — SIGNIFICANT CHANGE UP (ref 25–170)
CK SERPL-CCNC: 40 U/L — SIGNIFICANT CHANGE UP (ref 25–170)
MAGNESIUM SERPL-MCNC: 2.3 MG/DL — SIGNIFICANT CHANGE UP (ref 1.6–2.6)
MAGNESIUM SERPL-MCNC: 2.3 MG/DL — SIGNIFICANT CHANGE UP (ref 1.6–2.6)
TROPONIN T, HIGH SENSITIVITY RESULT: 51 NG/L — SIGNIFICANT CHANGE UP (ref 0–51)
TROPONIN T, HIGH SENSITIVITY RESULT: 51 NG/L — SIGNIFICANT CHANGE UP (ref 0–51)
TROPONIN T, HIGH SENSITIVITY RESULT: 53 NG/L — HIGH (ref 0–51)
TROPONIN T, HIGH SENSITIVITY RESULT: 53 NG/L — HIGH (ref 0–51)

## 2024-01-05 PROCEDURE — 99285 EMERGENCY DEPT VISIT HI MDM: CPT | Mod: 25

## 2024-01-05 PROCEDURE — 85610 PROTHROMBIN TIME: CPT

## 2024-01-05 PROCEDURE — 96366 THER/PROPH/DIAG IV INF ADDON: CPT

## 2024-01-05 PROCEDURE — 84484 ASSAY OF TROPONIN QUANT: CPT

## 2024-01-05 PROCEDURE — 83880 ASSAY OF NATRIURETIC PEPTIDE: CPT

## 2024-01-05 PROCEDURE — 85730 THROMBOPLASTIN TIME PARTIAL: CPT

## 2024-01-05 PROCEDURE — 71275 CT ANGIOGRAPHY CHEST: CPT | Mod: MA

## 2024-01-05 PROCEDURE — 70450 CT HEAD/BRAIN W/O DYE: CPT | Mod: MA

## 2024-01-05 PROCEDURE — 85025 COMPLETE CBC W/AUTO DIFF WBC: CPT

## 2024-01-05 PROCEDURE — 82550 ASSAY OF CK (CPK): CPT

## 2024-01-05 PROCEDURE — 80048 BASIC METABOLIC PNL TOTAL CA: CPT

## 2024-01-05 PROCEDURE — 82553 CREATINE MB FRACTION: CPT

## 2024-01-05 PROCEDURE — 83735 ASSAY OF MAGNESIUM: CPT

## 2024-01-05 PROCEDURE — 96365 THER/PROPH/DIAG IV INF INIT: CPT | Mod: XU

## 2024-01-05 PROCEDURE — 70450 CT HEAD/BRAIN W/O DYE: CPT | Mod: 26,MA

## 2024-01-05 PROCEDURE — 93005 ELECTROCARDIOGRAM TRACING: CPT

## 2024-01-05 PROCEDURE — 71275 CT ANGIOGRAPHY CHEST: CPT | Mod: 26,MA

## 2024-01-05 RX ORDER — APIXABAN 2.5 MG/1
10 TABLET, FILM COATED ORAL ONCE
Refills: 0 | Status: COMPLETED | OUTPATIENT
Start: 2024-01-05 | End: 2024-01-05

## 2024-01-05 RX ADMIN — Medication 2 GRAM(S): at 03:13

## 2024-01-05 RX ADMIN — APIXABAN 10 MILLIGRAM(S): 2.5 TABLET, FILM COATED ORAL at 05:18

## 2024-01-05 NOTE — ED ADULT NURSE REASSESSMENT NOTE - NS ED NURSE REASSESS COMMENT FT1
Received report from DIAMOND Bain. Pt is awake and alert, resting comfortably in stretcher, speaking in full coherent sentences. Pt denies CP, SOB, fevers, chills, N/V/D, HA, vision changes. Call bell within reach, comfort & safety provided. Pt awaiting nonemergent transportation.

## 2024-03-04 ENCOUNTER — APPOINTMENT (OUTPATIENT)
Age: 89
End: 2024-03-04

## 2025-07-14 NOTE — ED PROVIDER NOTE - BIRTH SEX
[] [facility]  Outpatient Rehabilitation &  Therapy  [address]  [phone]  [phone] [x] [facility]  Outpatient Rehabilitation &  Therapy  [address]  P: [phone]  F: [phone] [] [facility]  Outpatient Rehabilitation &  Therapy  [address]  P: [phone]  F: [phone] [] [facility]  Outpatient Rehabilitation &  Therapy  [address]  [phone]  [phone] [] [facility]  Outpatient Rehabilitation &  Therapy  [address]   P: [phone]  F: [phone]  [] [facility]  Outpatient Rehabilitation &  Therapy  [address]  P: [phone]  F: [phone] [] [facility]  Outpatient Rehabilitation &  Therapy  [address]  P: [phone]  F: [phone] [] [facility]  Outpatient Rehabilitation &  Therapy  [address]  P: [phone]  F: [phone] [] [facility]  Outpatient Rehabilitation &  Therapy  [address]  P: [phone]  F: [phone]  [] [facility] Outpatient Rehabilitation &  Therapy  [address]  P: [phone]  F: [phone]     Physical Therapy Daily Treatment Note    Date:  2025  Patient Name:  Dilma Valenzuela    :  1979  MRN: [id number]  Physician:  Dr. Quintana                         Insurance: [facility] FEDERAL (50 visits HARD MAX)  Medical Diagnosis: M17.12 (ICD-10-CM) - Primary osteoarthritis of left knee                    Rehab Codes: M25.562, M25.662, R26.89, R27.8, M62.552, Z73.6, M25.462   Onset Date: 25                 Next 's appt: 25       Visit# / total visits: 2;      Cancels/No Shows: 0/0    Frequency: 2 x/week for 12 visits     Subjective:    Pain:  [x] Yes  [] No Location: L knee  Pain Rating: (0-10 scale) 6/10  Pain  Female